# Patient Record
Sex: FEMALE | Race: WHITE | NOT HISPANIC OR LATINO | Employment: UNEMPLOYED | URBAN - METROPOLITAN AREA
[De-identification: names, ages, dates, MRNs, and addresses within clinical notes are randomized per-mention and may not be internally consistent; named-entity substitution may affect disease eponyms.]

---

## 2023-02-28 LAB — URINE CULTURE: ABNORMAL

## 2023-03-02 LAB
ABO GROUP (TYPE) IN BLOOD: NORMAL
ALANINE AMINOTRANSFERASE (SGPT) (U/L) IN SER/PLAS: 17 U/L (ref 7–45)
ANTIBODY SCREEN: NORMAL
ASPARTATE AMINOTRANSFERASE (SGOT) (U/L) IN SER/PLAS: 14 U/L (ref 9–39)
ERYTHROCYTE DISTRIBUTION WIDTH (RATIO) BY AUTOMATED COUNT: 13.8 % (ref 11.5–14.5)
ERYTHROCYTE MEAN CORPUSCULAR HEMOGLOBIN CONCENTRATION (G/DL) BY AUTOMATED: 33.4 G/DL (ref 32–36)
ERYTHROCYTE MEAN CORPUSCULAR VOLUME (FL) BY AUTOMATED COUNT: 93 FL (ref 80–100)
ERYTHROCYTES (10*6/UL) IN BLOOD BY AUTOMATED COUNT: 4.24 X10E12/L (ref 4–5.2)
HEMATOCRIT (%) IN BLOOD BY AUTOMATED COUNT: 39.5 % (ref 36–46)
HEMOGLOBIN (G/DL) IN BLOOD: 13.2 G/DL (ref 12–16)
HEPATITIS B VIRUS SURFACE AG PRESENCE IN SERUM: NONREACTIVE
HEPATITIS C VIRUS AB PRESENCE IN SERUM: NONREACTIVE
HIV 1/ 2 AG/AB SCREEN: NONREACTIVE
LEUKOCYTES (10*3/UL) IN BLOOD BY AUTOMATED COUNT: 8.3 X10E9/L (ref 4.4–11.3)
NRBC (PER 100 WBCS) BY AUTOMATED COUNT: 0 /100 WBC (ref 0–0)
PLATELETS (10*3/UL) IN BLOOD AUTOMATED COUNT: 199 X10E9/L (ref 150–450)
REFLEX ADDED, ANEMIA PANEL: NORMAL
RH FACTOR: NORMAL
RUBELLA VIRUS IGG AB: POSITIVE
SYPHILIS TOTAL AB: NONREACTIVE
URATE (MG/DL) IN SER/PLAS: 3.6 MG/DL (ref 2.3–6.7)

## 2023-03-14 LAB
CHLAMYDIA TRACH., AMPLIFIED: NEGATIVE
COLLECTION DURATION OF URINE: 24 HR
CREATININE (MG/24HR) IN 24 HOUR URINE: 1.72 G/24H (ref 0.67–1.59)
CREATININE (MG/DL) IN URINE: 108.5 MG/DL (ref 20–320)
N. GONORRHEA, AMPLIFIED: NEGATIVE
PROTEIN (MG/24HR) IN 24 HOUR URINE: 207 MG/24H (ref 0–149)
PROTEIN URINE: 13 MG/DL
VOLUME OF URINE: 1589 ML

## 2023-04-26 LAB
GLUCOSE, 1 HR SCREEN, PREG: 141 MG/DL
HEMATOCRIT (%) IN BLOOD BY AUTOMATED COUNT: 37.6 % (ref 36–46)
HEMOGLOBIN (G/DL) IN BLOOD: 12.1 G/DL (ref 12–16)
HEPATITIS B VIRUS SURFACE AG PRESENCE IN SERUM: NONREACTIVE

## 2023-05-05 LAB
GLUCOSE THREE HOUR: 87 MG/DL
GLUCOSE TWO HOUR: 91 MG/DL
GLUCOSE, FASTING: 73 MG/DL
GLUCOSE, ONE HOUR: 159 MG/DL
GTTCM: NORMAL

## 2023-08-06 LAB — GROUP B STREP SCREEN: NORMAL

## 2023-08-16 ENCOUNTER — HOSPITAL ENCOUNTER (OUTPATIENT)
Dept: DATA CONVERSION | Facility: HOSPITAL | Age: 28
End: 2023-08-16
Attending: OBSTETRICS & GYNECOLOGY
Payer: COMMERCIAL

## 2023-08-16 DIAGNOSIS — Z87.59 PERSONAL HISTORY OF OTHER COMPLICATIONS OF PREGNANCY, CHILDBIRTH AND THE PUERPERIUM: ICD-10-CM

## 2023-08-16 DIAGNOSIS — Z3A.38 38 WEEKS GESTATION OF PREGNANCY (HHS-HCC): ICD-10-CM

## 2023-08-16 DIAGNOSIS — O26.893 OTHER SPECIFIED PREGNANCY RELATED CONDITIONS, THIRD TRIMESTER (HHS-HCC): ICD-10-CM

## 2023-08-16 DIAGNOSIS — R03.0 ELEVATED BLOOD-PRESSURE READING, WITHOUT DIAGNOSIS OF HYPERTENSION: ICD-10-CM

## 2023-08-16 LAB
ABO GROUP (TYPE) IN BLOOD: NORMAL
ALANINE AMINOTRANSFERASE (SGPT) (U/L) IN SER/PLAS: 12 U/L (ref 7–45)
ALBUMIN (G/DL) IN SER/PLAS: 3.6 G/DL (ref 3.4–5)
ALKALINE PHOSPHATASE (U/L) IN SER/PLAS: 105 U/L (ref 33–110)
ANION GAP IN SER/PLAS: 14 MMOL/L (ref 10–20)
ANTIBODY SCREEN: NORMAL
ASPARTATE AMINOTRANSFERASE (SGOT) (U/L) IN SER/PLAS: 13 U/L (ref 9–39)
BILIRUBIN TOTAL (MG/DL) IN SER/PLAS: 0.9 MG/DL (ref 0–1.2)
CALCIUM (MG/DL) IN SER/PLAS: 8.6 MG/DL (ref 8.6–10.6)
CARBON DIOXIDE, TOTAL (MMOL/L) IN SER/PLAS: 22 MMOL/L (ref 21–32)
CHLORIDE (MMOL/L) IN SER/PLAS: 107 MMOL/L (ref 98–107)
CREATININE (MG/DL) IN SER/PLAS: 0.43 MG/DL (ref 0.5–1.05)
CREATININE (MG/DL) IN URINE: 40.2 MG/DL (ref 20–320)
ERYTHROCYTE DISTRIBUTION WIDTH (RATIO) BY AUTOMATED COUNT: 14.5 % (ref 11.5–14.5)
ERYTHROCYTE MEAN CORPUSCULAR HEMOGLOBIN CONCENTRATION (G/DL) BY AUTOMATED: 32.9 G/DL (ref 32–36)
ERYTHROCYTE MEAN CORPUSCULAR VOLUME (FL) BY AUTOMATED COUNT: 92 FL (ref 80–100)
ERYTHROCYTES (10*6/UL) IN BLOOD BY AUTOMATED COUNT: 3.7 X10E12/L (ref 4–5.2)
GFR FEMALE: >90 ML/MIN/1.73M2
GLUCOSE (MG/DL) IN SER/PLAS: 92 MG/DL (ref 74–99)
HEMATOCRIT (%) IN BLOOD BY AUTOMATED COUNT: 34 % (ref 36–46)
HEMOGLOBIN (G/DL) IN BLOOD: 11.2 G/DL (ref 12–16)
LACTATE DEHYDROGENASE (U/L) IN SER/PLAS BY LAC->PYR RXN: 162 U/L (ref 84–246)
LEUKOCYTES (10*3/UL) IN BLOOD BY AUTOMATED COUNT: 9.7 X10E9/L (ref 4.4–11.3)
NRBC (PER 100 WBCS) BY AUTOMATED COUNT: 0 /100 WBC (ref 0–0)
PLATELETS (10*3/UL) IN BLOOD AUTOMATED COUNT: 152 X10E9/L (ref 150–450)
POTASSIUM (MMOL/L) IN SER/PLAS: 3.9 MMOL/L (ref 3.5–5.3)
PROTEIN (MG/DL) IN URINE: 8 MG/DL (ref 5–24)
PROTEIN TOTAL: 6.2 G/DL (ref 6.4–8.2)
PROTEIN/CREATININE (MG/MG) IN URINE: 0.2 MG/MG CREAT (ref 0–0.17)
RH FACTOR: NORMAL
SODIUM (MMOL/L) IN SER/PLAS: 139 MMOL/L (ref 136–145)
URATE (MG/DL) IN SER/PLAS: 4.7 MG/DL (ref 2.3–6.7)
UREA NITROGEN (MG/DL) IN SER/PLAS: 8 MG/DL (ref 6–23)

## 2023-09-29 VITALS — HEIGHT: 65 IN | BODY MASS INDEX: 39.89 KG/M2 | WEIGHT: 239.42 LBS

## 2023-09-30 NOTE — PROGRESS NOTES
Current Stage:   Stage: Triage     OB Dating:   EDC/EGA:  ·  Final ALISE 27-Aug-2023   ·  EGA 38.3     Subjective Data:   Antepartum:  Vaginal Bleeding: No   Contractions/Abdominal Pain: Yes   Discharge/Loss of Fluid: No   Fetal Movement: Good   Fevers/Chills: No   Preeclampsia Symptoms: No   Antepartum:    26yo  at 38.3wks presenting for increased blood pressures at home. Pt states that she had a headache earlier today and subsequently took her blood pressure  several times throughout the course of the day. The headache resolved with Tylenol. Pt reports that her home pressures were 130s/80s-90s. Denies HA, vision changes, CP, SOB, leg swelling. Reports good fetal movement, some contractions. Denies VB/LOF.    ObHx:  G1, ,  41.5wks, 9#4oz, shoulder dytocia  G2, ,  40.1wks, PEC  G3, , SAB 10.5wks  G4, , , 40wks, 8#7oz  GynHx: Denies STIs/abnormal paps  PMH: Asthma  PSH: none  Meds: PNV  Allergies: NKDA  Family: non-contributory  Social: denies t/e/d use      Objective Information:    Objective Information:      T   P  R  BP   MAP  SpO2   Value  36.1  90  18  126/73   94  95%  Date/Time  1:02  1:17  1:02  1:17   1:17  1:16  Range  (36.1C - 36.1C )  (84 - 95 )  (18 - 18 )  (126 - 129 )/ (73 - 75 )  (94 - 96 )  (95% - 98% )      Pain reported at  1:02: 0 = None      Physical Exam:   Constitutional: Well appearing   Obstetric: SVE: 2.5/50/-3  Fetal Tracing: baseline 150s, moderate variability, +accels, -decels  [place]: contractions q12 minutes   Eyes: Pupils equal   Respiratory/Thorax: Breathing comfortably on RA   Cardiovascular: Well-perfused   Musculoskeletal: Full range of motion   Neurological: Appropriate affect   Skin: No rashes/lesions     Recent Lab Results:    Results:        I have reviewed these laboratory results:    Complete Blood Count  16-Aug-2023 01:17:00      Result Value    White Blood Cell Count  9.7    Nucleated Erythrocyte Count  0.0     Red Blood Cell Count  3.70   L   HGB  11.2   L   HCT  34.0   L   MCV  92    MCHC  32.9    PLT  152    RDW-CV  14.5      Comprehensive Metabolic Panel  16-Aug-2023 01:17:00      Result Value    Glucose, Serum  92    NA  139    K  3.9    CL  107    Bicarbonate, Serum  22    Anion Gap, Serum  14    BUN  8    CREAT  0.43   L   GFR Female  >90    Calcium, Serum  8.6    ALB  3.6    ALKP  105    T Pro  6.2   L   T Bili  0.9    Alanine Aminotransferase, Serum  12    Aspartate Transaminase, Serum  13      Total Protein, Urine Spot  16-Aug-2023 01:17:00      Result Value    Total Protein Urine Spot  8    T. Protein/Creat Ratio  0.20   H   Creatinine, Urine Spot  40.2      Lactate Dehydrogenase, Serum  16-Aug-2023 01:17:00      Result Value    LDH  162      Uric Acid, Serum  16-Aug-2023 01:17:00      Result Value    Uric Acid, Serum  4.7          Testing:   NST Interpretation - Baby A:  ·  Baseline     ·  Variability moderate (amplitude range 6 to 25 bpm)    ·  Interpretation Reactive (2 15x15 accels)    ·  Accelerations +   ·  Decelerations -     Assessment and Plan:   Assessment:    26yo  at 38.3wks presenting for increased blood pressures at home.  Pt otherwise has no hx of mild range pressures during this pregnancy and has been normotensive  in triage.    High normal BPs with one diastolic in 90s at home, h/o PEC, normotensive here in triage, asymptomatic, normal labs  - Pt does not currently meet criteria for gHTN  - HELLP labs normal  - Continue home BP monitoring.   F/u in office in 5 days as scheduled, sooner as needed.     Maia Arreola, MS4  Labor and Deliver Acting Intern    Discussed with Dr. Hammer and Dr. Kasper    Attestation:   Note Completion:  I am a:  Medical Student/Acting Intern   Medical Student Attestation I, or a resident under my supervision, was present with the medical student who participated in the documentation of this note.  I have personally seen and examined the patient and  performed the medical decision-making components. I have reviewed the medical student documentation and/or resident documentation and verified the findings in the note as written with additions or exceptions  as stated in the body of this note.    I personally evaluated the patient on 16-Aug-2023   Comments/ Additional Findings     @ 38 weeks 3d, presented with higher BPs at home with one diastolic in the 90s.  h/o PEC  Normotensive here.  Labs normal  Cat 1 tracing  Continue home BP monitoring.  Return precautions discussed  Bryan Kasper MD          Electronic Signatures:  Roberto Carlos Arreolahel (MED STUD)  (Signed 16-Aug-2023 02:07)   Authored: Current Stage, OB Dating, Subjective Data,  Objective Data, Assessment and Plan, Note Completion  Bryan Kasper)  (Signed 16-Aug-2023 03:09)   Authored: Objective Data,  Testing, Assessment  and Plan, Note Completion      Last Updated: 16-Aug-2023 03:09 by Bryan Kasper)

## 2024-01-11 ENCOUNTER — APPOINTMENT (OUTPATIENT)
Dept: OBSTETRICS AND GYNECOLOGY | Facility: CLINIC | Age: 29
End: 2024-01-11
Payer: COMMERCIAL

## 2024-01-11 ENCOUNTER — OFFICE VISIT (OUTPATIENT)
Dept: OBSTETRICS AND GYNECOLOGY | Facility: CLINIC | Age: 29
End: 2024-01-11
Payer: COMMERCIAL

## 2024-01-11 VITALS
WEIGHT: 208 LBS | HEIGHT: 65 IN | DIASTOLIC BLOOD PRESSURE: 62 MMHG | SYSTOLIC BLOOD PRESSURE: 114 MMHG | BODY MASS INDEX: 34.66 KG/M2

## 2024-01-11 DIAGNOSIS — Z30.41 SURVEILLANCE FOR BIRTH CONTROL, ORAL CONTRACEPTIVES: ICD-10-CM

## 2024-01-11 DIAGNOSIS — Z01.419 WELL WOMAN EXAM WITH ROUTINE GYNECOLOGICAL EXAM: Primary | ICD-10-CM

## 2024-01-11 PROCEDURE — 88175 CYTOPATH C/V AUTO FLUID REDO: CPT

## 2024-01-11 PROCEDURE — 1036F TOBACCO NON-USER: CPT | Performed by: OBSTETRICS & GYNECOLOGY

## 2024-01-11 PROCEDURE — 99395 PREV VISIT EST AGE 18-39: CPT | Performed by: OBSTETRICS & GYNECOLOGY

## 2024-01-11 RX ORDER — NORETHINDRONE 0.35 MG/1
1 TABLET ORAL
COMMUNITY
Start: 2023-09-05 | End: 2024-01-11 | Stop reason: SDUPTHER

## 2024-01-11 RX ORDER — NORETHINDRONE 0.35 MG/1
1 TABLET ORAL
Qty: 90 TABLET | Refills: 3 | Status: SHIPPED | OUTPATIENT
Start: 2024-01-11

## 2024-01-11 NOTE — PROGRESS NOTES
"Miriam Landau is a 28 y.o. female who is here for a routine exam.       Sexually active: [place]    Regular self breast exam: yes  No concerns on her exams    Menstrual History:  OB History          5    Para   5    Term                AB        Living   5         SAB        IAB        Ectopic        Multiple        Live Births                    No LMP recorded (lmp unknown).         Review of Systems  All Normal Review of Systems  Constitutional: no fever, no chills, no recent weight gain, no recent weight loss and no fatigue.    Gastrointestinal: no abdominal pain, no constipation, no nausea, no diarrhea and no vomiting.    Genitourinary: no dysuria, no urinary incontinence, no vaginal dryness, no vaginal itching, no dyspareunia, no pelvic pain, no dysmenorrhea, no sexual problems, no change in urinary frequency, no vaginal discharge, no unexplained vaginal bleeding and no lesion/sore.    Breasts: No masses.  No nipple discharge.  No redness    Objective   /62   Ht 1.651 m (5' 5\")   Wt 94.3 kg (208 lb)   LMP  (LMP Unknown)   Breastfeeding Yes   BMI 34.61 kg/m²     OBGyn Exam   Physical Exam  Constitutional: Alert and in no acute distress. Well developed, well nourished.   Head and Face: Head and face: Normal.    Eyes: Normal external exam - nonicteric sclera, extraocular movements intact (EOMI) and no ptosis.   Ears, Nose, Mouth, and Throat: External inspection of ears and nose: Normal.    Neck: No neck asymmetry. Supple. Thyroid not enlarged and there were no palpable thyroid nodules.   Chest: Breasts: Normal appearance, no nipple discharge and no skin changes. Palpation of breasts and axillae: No palpable mass and no axillary lymphadenopathy.   Abdomen: Soft nontender; no abdominal mass palpated. No organomegaly. No hernias.     Genitourinary:   External genitalia: Normal. No inguinal lymphadenopathy. Bartholin's Urethral and Skenes Glands: Normal. Urethra: Normal.    Bladder: Normal " on palpation.   Vagina: Normal. No discharge  Cervix: Normal.    Uterus: Normal.    Right Adnexa/parametria: Normal.  Left Adnexa/parametria: Normal.    Inspection of Perianal Area: Normal.     Musculoskeletal: No joint swelling seen, normal movements of all extremities.   Skin: Normal skin color and pigmentation, normal skin turgor, and no rash.   Neurologic: Non-focal. Grossly intact.   Psychiatric: Alert and oriented x 3. Affect normal to patient baseline. Mood: Appropriate.      Assessment/Plan   1) Annual exam:  Pap with reflex HPV testing completed today    Thank you again for your visit to our office today  Please follow-up as needed or in 1 year with your annual exam

## 2024-01-24 LAB
CYTOLOGY CMNT CVX/VAG CYTO-IMP: NORMAL
LAB AP HPV GENOTYPE QUESTION: YES
LAB AP HPV HR: NORMAL
LABORATORY COMMENT REPORT: NORMAL
PATH REPORT.TOTAL CANCER: NORMAL

## 2024-07-10 ENCOUNTER — TELEPHONE (OUTPATIENT)
Dept: OBSTETRICS AND GYNECOLOGY | Facility: CLINIC | Age: 29
End: 2024-07-10
Payer: COMMERCIAL

## 2024-07-10 NOTE — TELEPHONE ENCOUNTER
Miriam Landau called in requesting Rx for birth control .    Last Office Visit: 1/11/2024  Next Office Visit: 1/16/2025  Med: norethindrone (Micronor) 0.35 mg tablet   [facility]: [facility]

## 2024-11-06 ENCOUNTER — TELEPHONE (OUTPATIENT)
Dept: OBSTETRICS AND GYNECOLOGY | Facility: CLINIC | Age: 29
End: 2024-11-06
Payer: COMMERCIAL

## 2024-11-06 NOTE — TELEPHONE ENCOUNTER
Left voicemail to call office back so that we can do her NEW ob intake questions before 11/07/2024 appointment.    Fartun Helton MA

## 2024-11-07 ENCOUNTER — LAB REQUISITION (OUTPATIENT)
Dept: LAB | Facility: HOSPITAL | Age: 29
End: 2024-11-07
Payer: COMMERCIAL

## 2024-11-07 ENCOUNTER — APPOINTMENT (OUTPATIENT)
Dept: OBSTETRICS AND GYNECOLOGY | Facility: CLINIC | Age: 29
End: 2024-11-07
Payer: COMMERCIAL

## 2024-11-07 VITALS — BODY MASS INDEX: 32.62 KG/M2 | WEIGHT: 196 LBS | SYSTOLIC BLOOD PRESSURE: 100 MMHG | DIASTOLIC BLOOD PRESSURE: 62 MMHG

## 2024-11-07 DIAGNOSIS — Z64.1 PROBLEMS RELATED TO MULTIPARITY: ICD-10-CM

## 2024-11-07 DIAGNOSIS — Z64.1 GRAND MULTIPARITY: ICD-10-CM

## 2024-11-07 DIAGNOSIS — Z3A.09 9 WEEKS GESTATION OF PREGNANCY (HHS-HCC): ICD-10-CM

## 2024-11-07 DIAGNOSIS — N92.6 IRREGULAR MENSES: ICD-10-CM

## 2024-11-07 DIAGNOSIS — Z3A.09 9 WEEKS GESTATION OF PREGNANCY (HHS-HCC): Primary | ICD-10-CM

## 2024-11-07 LAB
POC BILIRUBIN, URINE: NEGATIVE
POC BLOOD, URINE: NEGATIVE
POC GLUCOSE, URINE: NEGATIVE MG/DL
POC KETONES, URINE: NEGATIVE MG/DL
POC LEUKOCYTES, URINE: ABNORMAL
POC NITRITE,URINE: NEGATIVE
POC PH, URINE: 6 PH
POC PROTEIN, URINE: NEGATIVE MG/DL
POC SPECIFIC GRAVITY, URINE: 1.02
POC UROBILINOGEN, URINE: 0.2 EU/DL
PREGNANCY TEST URINE, POC: POSITIVE

## 2024-11-07 PROCEDURE — 87086 URINE CULTURE/COLONY COUNT: CPT

## 2024-11-07 PROCEDURE — 86803 HEPATITIS C AB TEST: CPT

## 2024-11-07 PROCEDURE — 86317 IMMUNOASSAY INFECTIOUS AGENT: CPT

## 2024-11-07 PROCEDURE — 86850 RBC ANTIBODY SCREEN: CPT

## 2024-11-07 PROCEDURE — 87389 HIV-1 AG W/HIV-1&-2 AB AG IA: CPT

## 2024-11-07 PROCEDURE — 86901 BLOOD TYPING SEROLOGIC RH(D): CPT

## 2024-11-07 PROCEDURE — 87491 CHLMYD TRACH DNA AMP PROBE: CPT

## 2024-11-07 PROCEDURE — 86780 TREPONEMA PALLIDUM: CPT

## 2024-11-07 PROCEDURE — 85027 COMPLETE CBC AUTOMATED: CPT

## 2024-11-07 PROCEDURE — 87591 N.GONORRHOEAE DNA AMP PROB: CPT

## 2024-11-07 PROCEDURE — 86900 BLOOD TYPING SEROLOGIC ABO: CPT

## 2024-11-07 PROCEDURE — 87340 HEPATITIS B SURFACE AG IA: CPT

## 2024-11-07 RX ORDER — DEXTROMETHORPHAN HYDROBROMIDE, GUAIFENESIN 30; 600 MG/1; MG/1
TABLET, EXTENDED RELEASE ORAL
COMMUNITY
Start: 2024-10-28

## 2024-11-07 RX ORDER — FLUTICASONE PROPIONATE 50 MCG
SPRAY, SUSPENSION (ML) NASAL
COMMUNITY
Start: 2019-03-17

## 2024-11-07 ASSESSMENT — EDINBURGH POSTNATAL DEPRESSION SCALE (EPDS)
THE THOUGHT OF HARMING MYSELF HAS OCCURRED TO ME: NEVER
I HAVE BEEN ANXIOUS OR WORRIED FOR NO GOOD REASON: NO, NOT AT ALL
I HAVE BEEN ABLE TO LAUGH AND SEE THE FUNNY SIDE OF THINGS: AS MUCH AS I ALWAYS COULD
TOTAL SCORE: 0
I HAVE LOOKED FORWARD WITH ENJOYMENT TO THINGS: AS MUCH AS I EVER DID
I HAVE BEEN SO UNHAPPY THAT I HAVE HAD DIFFICULTY SLEEPING: NOT AT ALL
I HAVE FELT SAD OR MISERABLE: NO, NOT AT ALL
THINGS HAVE BEEN GETTING ON TOP OF ME: NO, I HAVE BEEN COPING AS WELL AS EVER
I HAVE BLAMED MYSELF UNNECESSARILY WHEN THINGS WENT WRONG: NO, NEVER
I HAVE BEEN SO UNHAPPY THAT I HAVE BEEN CRYING: NO, NEVER
I HAVE FELT SCARED OR PANICKY FOR NO GOOD REASON: NO, NOT AT ALL

## 2024-11-07 NOTE — PROGRESS NOTES
Subjective   Patient ID [id number]   Miriam Landau is a 28 y.o.  at 9w4d with a working estimated date of delivery of 2025, by Last Menstrual Period who presents for an initial prenatal visit.     Her pregnancy is complicated by:  Grand multipara    OB History    Para Term  AB Living   5 4       5   SAB IAB Ectopic Multiple Live Births           4      # Outcome Date GA Lbr Bobby/2nd Weight Sex Type Anes PTL Lv   5 Current            4 Para 23    M Vag-Spont   VINNY   3 Para 21    M Vag-Spont   VINNY   2 Para 20    F Vag-Spont   VINNY      Complications: Preeclampsia (HHS-HCC)   1 Para 10//    M Vag-Spont   VINNY      Complications: Shoulder Dystocia     [place]  Depression Scale Total: 0    Objective   : see OB vitals          OBGyn Exam : see OB chart    Prenatal Labs  Ordered today    Assessment/Plan   1) IUP at 9w4d  Prenatal Labs ordered  Genetics declined  Delivery at [facility] and call group discussed with patient.  Daily prenatal vitamins prescribed    2) Grand multipara    Follow up in 4 weeks for return OB visit.

## 2024-11-08 LAB
ABO GROUP (TYPE) IN BLOOD: NORMAL
ANTIBODY SCREEN: NORMAL
C TRACH RRNA SPEC QL NAA+PROBE: NEGATIVE
ERYTHROCYTE [DISTWIDTH] IN BLOOD BY AUTOMATED COUNT: 13.2 % (ref 11.5–14.5)
HBV SURFACE AG SERPL QL IA: NONREACTIVE
HCT VFR BLD AUTO: 40.3 % (ref 36–46)
HCV AB SER QL: NONREACTIVE
HGB BLD-MCNC: 13.2 G/DL (ref 12–16)
HIV 1+2 AB+HIV1 P24 AG SERPL QL IA: NONREACTIVE
MCH RBC QN AUTO: 30.3 PG (ref 26–34)
MCHC RBC AUTO-ENTMCNC: 32.8 G/DL (ref 32–36)
MCV RBC AUTO: 92 FL (ref 80–100)
N GONORRHOEA DNA SPEC QL PROBE+SIG AMP: NEGATIVE
NRBC BLD-RTO: 0 /100 WBCS (ref 0–0)
PLATELET # BLD AUTO: 220 X10*3/UL (ref 150–450)
RBC # BLD AUTO: 4.36 X10*6/UL (ref 4–5.2)
REFLEX ADDED, ANEMIA PANEL: NORMAL
RH FACTOR (ANTIGEN D): NORMAL
RUBV IGG SERPL IA-ACNC: 2 IA
RUBV IGG SERPL QL IA: POSITIVE
TREPONEMA PALLIDUM IGG+IGM AB [PRESENCE] IN SERUM OR PLASMA BY IMMUNOASSAY: NONREACTIVE
WBC # BLD AUTO: 9 X10*3/UL (ref 4.4–11.3)

## 2024-11-09 LAB — BACTERIA UR CULT: NORMAL

## 2024-12-02 ENCOUNTER — APPOINTMENT (OUTPATIENT)
Dept: OBSTETRICS AND GYNECOLOGY | Facility: CLINIC | Age: 29
End: 2024-12-02
Payer: COMMERCIAL

## 2024-12-02 VITALS — BODY MASS INDEX: 32.62 KG/M2 | DIASTOLIC BLOOD PRESSURE: 60 MMHG | WEIGHT: 196 LBS | SYSTOLIC BLOOD PRESSURE: 110 MMHG

## 2024-12-02 DIAGNOSIS — Z36.9 PRENATAL SCREENING ENCOUNTER: Primary | ICD-10-CM

## 2024-12-02 DIAGNOSIS — Z64.1 GRAND MULTIPARITY: ICD-10-CM

## 2024-12-02 DIAGNOSIS — Z3A.13 13 WEEKS GESTATION OF PREGNANCY (HHS-HCC): ICD-10-CM

## 2024-12-02 LAB
POC BILIRUBIN, URINE: NEGATIVE
POC BLOOD, URINE: ABNORMAL
POC GLUCOSE, URINE: NEGATIVE MG/DL
POC KETONES, URINE: NEGATIVE MG/DL
POC LEUKOCYTES, URINE: NEGATIVE
POC NITRITE,URINE: NEGATIVE
POC PH, URINE: 5.5 PH
POC PROTEIN, URINE: NEGATIVE MG/DL
POC SPECIFIC GRAVITY, URINE: 1.02
POC UROBILINOGEN, URINE: 0.2 EU/DL

## 2024-12-02 PROCEDURE — 99213 OFFICE O/P EST LOW 20 MIN: CPT | Performed by: OBSTETRICS & GYNECOLOGY

## 2024-12-02 NOTE — PROGRESS NOTES
Subjective   Patient ID [id number]   Miriam Landau is a 29 y.o. at 13w1d with a working estimated date of delivery of Estimated Date of Delivery: 6/8/25 She denies vaginal bleeding, leakage of fluid, decreased fetal movements, and contractions.      Assessment/Plan    1) IUP 13w1d   Continue prenatal vitamin.  Labs: Rh+  All testing within normal limits  Declined genetics  Will schedule her 19-week anatomy scan    2) Grand multipara     Follow-up in 4 wk

## 2024-12-02 NOTE — LETTER
12/2/2024    To Whom It May Concern:    This is to certify that my patient,Miriam Landau is under my care for her pregnancy.    The following guidelines may be used for any dental work:  You may use a local anesthetic with or without Epinephrine.  You may prescribe any Penicillin or Cephalosporin if an antibiotic is necessary. (Dependent on allergy history)  You may take x-rays with a lead apron if necessary.  You may prescribe Tylenol and/or narcotics for pain.  You may extract teeth if necessary.    If you need further information or if you have any concerns, please contact our office.    Sincerely,        Shayan Desouza MD   Candice Patel

## 2024-12-29 PROBLEM — Z3A.17 17 WEEKS GESTATION OF PREGNANCY (HHS-HCC): Status: ACTIVE | Noted: 2024-12-29

## 2024-12-30 ENCOUNTER — APPOINTMENT (OUTPATIENT)
Dept: OBSTETRICS AND GYNECOLOGY | Facility: CLINIC | Age: 29
End: 2024-12-30
Payer: COMMERCIAL

## 2024-12-30 VITALS — SYSTOLIC BLOOD PRESSURE: 120 MMHG | WEIGHT: 198.8 LBS | BODY MASS INDEX: 33.08 KG/M2 | DIASTOLIC BLOOD PRESSURE: 60 MMHG

## 2024-12-30 DIAGNOSIS — Z3A.17 17 WEEKS GESTATION OF PREGNANCY (HHS-HCC): ICD-10-CM

## 2024-12-30 DIAGNOSIS — Z36.9 PRENATAL SCREENING ENCOUNTER: Primary | ICD-10-CM

## 2024-12-30 LAB
POC BILIRUBIN, URINE: NEGATIVE
POC BLOOD, URINE: ABNORMAL
POC GLUCOSE, URINE: NEGATIVE MG/DL
POC KETONES, URINE: NEGATIVE MG/DL
POC LEUKOCYTES, URINE: NEGATIVE
POC NITRITE,URINE: NEGATIVE
POC PH, URINE: 5.5 PH
POC PROTEIN, URINE: NEGATIVE MG/DL
POC SPECIFIC GRAVITY, URINE: 1.01
POC UROBILINOGEN, URINE: 0.2 EU/DL

## 2024-12-30 PROCEDURE — 99213 OFFICE O/P EST LOW 20 MIN: CPT | Performed by: OBSTETRICS & GYNECOLOGY

## 2024-12-30 NOTE — PROGRESS NOTES
Subjective   Patient ID [id number]   Miriam Landau is a 29 y.o. at 17w1d with a working estimated date of delivery of Estimated Date of Delivery: 6/8/25 She denies vaginal bleeding, leakage of fluid, decreased fetal movements, and contractions.      Assessment/Plan    1) IUP 17w1d   Continue prenatal vitamin.  Labs: Rh+    2) Grand multipara     Follow-up in 4 wk

## 2025-01-13 ENCOUNTER — HOSPITAL ENCOUNTER (OUTPATIENT)
Dept: RADIOLOGY | Facility: CLINIC | Age: 30
Discharge: HOME | End: 2025-01-13
Payer: COMMERCIAL

## 2025-01-13 DIAGNOSIS — Z3A.09 9 WEEKS GESTATION OF PREGNANCY (HHS-HCC): ICD-10-CM

## 2025-01-13 DIAGNOSIS — Z64.1 GRAND MULTIPARITY: ICD-10-CM

## 2025-01-13 PROCEDURE — 76811 OB US DETAILED SNGL FETUS: CPT | Performed by: STUDENT IN AN ORGANIZED HEALTH CARE EDUCATION/TRAINING PROGRAM

## 2025-01-13 PROCEDURE — 76811 OB US DETAILED SNGL FETUS: CPT

## 2025-01-16 ENCOUNTER — APPOINTMENT (OUTPATIENT)
Dept: OBSTETRICS AND GYNECOLOGY | Facility: CLINIC | Age: 30
End: 2025-01-16
Payer: COMMERCIAL

## 2025-01-27 ENCOUNTER — APPOINTMENT (OUTPATIENT)
Dept: OBSTETRICS AND GYNECOLOGY | Facility: CLINIC | Age: 30
End: 2025-01-27
Payer: COMMERCIAL

## 2025-01-27 VITALS — WEIGHT: 198 LBS | SYSTOLIC BLOOD PRESSURE: 110 MMHG | BODY MASS INDEX: 32.95 KG/M2 | DIASTOLIC BLOOD PRESSURE: 60 MMHG

## 2025-01-27 DIAGNOSIS — Z36.9 PRENATAL SCREENING ENCOUNTER: Primary | ICD-10-CM

## 2025-01-27 DIAGNOSIS — Z3A.21 21 WEEKS GESTATION OF PREGNANCY (HHS-HCC): ICD-10-CM

## 2025-01-27 LAB
POC BILIRUBIN, URINE: NEGATIVE
POC BLOOD, URINE: NEGATIVE
POC GLUCOSE, URINE: NEGATIVE MG/DL
POC KETONES, URINE: NEGATIVE MG/DL
POC LEUKOCYTES, URINE: NEGATIVE
POC NITRITE,URINE: NEGATIVE
POC PH, URINE: 5.5 PH
POC PROTEIN, URINE: NEGATIVE MG/DL
POC SPECIFIC GRAVITY, URINE: 1.02
POC UROBILINOGEN, URINE: 0.2 EU/DL

## 2025-01-27 PROCEDURE — 99213 OFFICE O/P EST LOW 20 MIN: CPT | Performed by: OBSTETRICS & GYNECOLOGY

## 2025-01-27 NOTE — PROGRESS NOTES
Subjective   Patient ID [id number]   Miriam Landau is a 29 y.o. at 21w1d with a working estimated date of delivery of Estimated Date of Delivery: 6/8/25 She denies vaginal bleeding, leakage of fluid, decreased fetal movements, and contractions.      Assessment/Plan    1) IUP 21w1d   Continue prenatal vitamin.  Labs: rh+  Glucola to pt    2) grand multipara        Follow-up in 4 wk

## 2025-02-21 PROBLEM — Z3A.25 25 WEEKS GESTATION OF PREGNANCY (HHS-HCC): Status: ACTIVE | Noted: 2024-12-29

## 2025-02-24 ENCOUNTER — APPOINTMENT (OUTPATIENT)
Dept: OBSTETRICS AND GYNECOLOGY | Facility: CLINIC | Age: 30
End: 2025-02-24
Payer: COMMERCIAL

## 2025-02-24 VITALS — WEIGHT: 207 LBS | DIASTOLIC BLOOD PRESSURE: 60 MMHG | SYSTOLIC BLOOD PRESSURE: 120 MMHG | BODY MASS INDEX: 34.45 KG/M2

## 2025-02-24 DIAGNOSIS — Z13.1 DIABETES MELLITUS SCREENING: ICD-10-CM

## 2025-02-24 DIAGNOSIS — Z36.9 PRENATAL SCREENING ENCOUNTER: Primary | ICD-10-CM

## 2025-02-24 DIAGNOSIS — Z3A.25 25 WEEKS GESTATION OF PREGNANCY (HHS-HCC): ICD-10-CM

## 2025-02-24 LAB
POC BILIRUBIN, URINE: NEGATIVE
POC BLOOD, URINE: NEGATIVE
POC GLUCOSE, URINE: NEGATIVE MG/DL
POC KETONES, URINE: NEGATIVE MG/DL
POC LEUKOCYTES, URINE: NEGATIVE
POC NITRITE,URINE: NEGATIVE
POC PH, URINE: 6 PH
POC PROTEIN, URINE: NEGATIVE MG/DL
POC SPECIFIC GRAVITY, URINE: 1.02
POC UROBILINOGEN, URINE: 0.2 EU/DL

## 2025-02-24 PROCEDURE — 99213 OFFICE O/P EST LOW 20 MIN: CPT | Performed by: OBSTETRICS & GYNECOLOGY

## 2025-02-24 PROCEDURE — 1036F TOBACCO NON-USER: CPT | Performed by: OBSTETRICS & GYNECOLOGY

## 2025-02-24 NOTE — PROGRESS NOTES
Subjective   Patient ID [id number]   Miriam Landau is a 29 y.o. at 25w1d with a working estimated date of delivery of Estimated Date of Delivery: 6/8/25 She denies vaginal bleeding, leakage of fluid, decreased fetal movements, and contractions.      Assessment/Plan    1) IUP 25w1d   Continue prenatal vitamin.  Labs: Rh+  Glucola today    2) grand multipara     Follow-up in 4 wk

## 2025-02-25 LAB
GLUCOSE 1H P 50 G GLC PO SERPL-MCNC: 125 MG/DL
HBV SURFACE AG SERPL QL IA: NORMAL
HCT VFR BLD AUTO: 37.4 % (ref 35–45)
HGB BLD-MCNC: 12.4 G/DL (ref 11.7–15.5)

## 2025-03-10 ENCOUNTER — APPOINTMENT (OUTPATIENT)
Dept: OBSTETRICS AND GYNECOLOGY | Facility: CLINIC | Age: 30
End: 2025-03-10
Payer: COMMERCIAL

## 2025-03-24 ENCOUNTER — APPOINTMENT (OUTPATIENT)
Dept: OBSTETRICS AND GYNECOLOGY | Facility: CLINIC | Age: 30
End: 2025-03-24
Payer: COMMERCIAL

## 2025-03-24 VITALS — DIASTOLIC BLOOD PRESSURE: 64 MMHG | SYSTOLIC BLOOD PRESSURE: 132 MMHG | BODY MASS INDEX: 35.45 KG/M2 | WEIGHT: 213 LBS

## 2025-03-24 DIAGNOSIS — Z3A.29 29 WEEKS GESTATION OF PREGNANCY (HHS-HCC): ICD-10-CM

## 2025-03-24 DIAGNOSIS — Z36.9 PRENATAL SCREENING ENCOUNTER: Primary | ICD-10-CM

## 2025-03-24 LAB
POC BILIRUBIN, URINE: NEGATIVE
POC BLOOD, URINE: ABNORMAL
POC GLUCOSE, URINE: NEGATIVE MG/DL
POC KETONES, URINE: NEGATIVE MG/DL
POC LEUKOCYTES, URINE: NEGATIVE
POC NITRITE,URINE: NEGATIVE
POC PH, URINE: 5.5 PH
POC PROTEIN, URINE: NEGATIVE MG/DL
POC SPECIFIC GRAVITY, URINE: >=1.03
POC UROBILINOGEN, URINE: 0.2 EU/DL

## 2025-03-24 PROCEDURE — 99213 OFFICE O/P EST LOW 20 MIN: CPT | Performed by: OBSTETRICS & GYNECOLOGY

## 2025-03-24 NOTE — PROGRESS NOTES
Subjective   Patient ID [id number]   Miriam Landau is a 29 y.o. at 29w1d with a working estimated date of delivery of Estimated Date of Delivery: 6/8/25 She denies vaginal bleeding, leakage of fluid, decreased fetal movements, and contractions.      Assessment/Plan    1) IUP 29w1d   Continue prenatal vitamin.  Labs: Rh+.  Glucola within normal limits    2) grand multipara     Follow-up in 2 wk

## 2025-04-09 PROBLEM — Z3A.31 31 WEEKS GESTATION OF PREGNANCY (HHS-HCC): Status: ACTIVE | Noted: 2024-12-29

## 2025-04-10 ENCOUNTER — APPOINTMENT (OUTPATIENT)
Dept: OBSTETRICS AND GYNECOLOGY | Facility: CLINIC | Age: 30
End: 2025-04-10
Payer: COMMERCIAL

## 2025-04-10 VITALS — BODY MASS INDEX: 36.28 KG/M2 | DIASTOLIC BLOOD PRESSURE: 62 MMHG | WEIGHT: 218 LBS | SYSTOLIC BLOOD PRESSURE: 120 MMHG

## 2025-04-10 DIAGNOSIS — Z3A.31 31 WEEKS GESTATION OF PREGNANCY (HHS-HCC): ICD-10-CM

## 2025-04-10 DIAGNOSIS — Z36.9 PRENATAL SCREENING ENCOUNTER: Primary | ICD-10-CM

## 2025-04-10 LAB
POC BILIRUBIN, URINE: NEGATIVE
POC BLOOD, URINE: ABNORMAL
POC GLUCOSE, URINE: NEGATIVE MG/DL
POC KETONES, URINE: NEGATIVE MG/DL
POC LEUKOCYTES, URINE: ABNORMAL
POC NITRITE,URINE: NEGATIVE
POC PH, URINE: 5.5 PH
POC PROTEIN, URINE: ABNORMAL MG/DL
POC SPECIFIC GRAVITY, URINE: >=1.03
POC UROBILINOGEN, URINE: 0.2 EU/DL

## 2025-04-10 PROCEDURE — 1036F TOBACCO NON-USER: CPT | Performed by: OBSTETRICS & GYNECOLOGY

## 2025-04-10 PROCEDURE — 99213 OFFICE O/P EST LOW 20 MIN: CPT | Performed by: OBSTETRICS & GYNECOLOGY

## 2025-04-10 NOTE — PROGRESS NOTES
Subjective   Patient ID [id number]   Miriam Landau is a 29 y.o. at 31w4d with a working estimated date of delivery of Estimated Date of Delivery: 6/8/25 She denies vaginal bleeding, leakage of fluid, decreased fetal movements, and contractions.      Assessment/Plan    1) IUP 31w4d   Continue prenatal vitamin.  Labs: n/a    2) grand multipara      Follow-up in 2 wk

## 2025-04-18 PROBLEM — Z3A.33 33 WEEKS GESTATION OF PREGNANCY (HHS-HCC): Status: ACTIVE | Noted: 2024-12-29

## 2025-04-21 ENCOUNTER — APPOINTMENT (OUTPATIENT)
Dept: OBSTETRICS AND GYNECOLOGY | Facility: CLINIC | Age: 30
End: 2025-04-21
Payer: COMMERCIAL

## 2025-04-21 VITALS — BODY MASS INDEX: 36.28 KG/M2 | WEIGHT: 218 LBS | DIASTOLIC BLOOD PRESSURE: 60 MMHG | SYSTOLIC BLOOD PRESSURE: 122 MMHG

## 2025-04-21 DIAGNOSIS — Z36.9 PRENATAL SCREENING ENCOUNTER: Primary | ICD-10-CM

## 2025-04-21 DIAGNOSIS — Z3A.33 33 WEEKS GESTATION OF PREGNANCY (HHS-HCC): ICD-10-CM

## 2025-04-21 LAB
POC BILIRUBIN, URINE: NEGATIVE
POC BLOOD, URINE: NEGATIVE
POC GLUCOSE, URINE: NEGATIVE MG/DL
POC KETONES, URINE: NEGATIVE MG/DL
POC LEUKOCYTES, URINE: ABNORMAL
POC NITRITE,URINE: NEGATIVE
POC PH, URINE: 7.5 PH
POC PROTEIN, URINE: NEGATIVE MG/DL
POC SPECIFIC GRAVITY, URINE: 1.02
POC UROBILINOGEN, URINE: 0.2 EU/DL

## 2025-04-21 PROCEDURE — 99213 OFFICE O/P EST LOW 20 MIN: CPT | Performed by: OBSTETRICS & GYNECOLOGY

## 2025-04-21 PROCEDURE — 1036F TOBACCO NON-USER: CPT | Performed by: OBSTETRICS & GYNECOLOGY

## 2025-04-21 NOTE — PROGRESS NOTES
Subjective   Patient ID [id number]   Miriam Landau is a 29 y.o. at 33w1d with a working estimated date of delivery of Estimated Date of Delivery: 6/8/25 She denies vaginal bleeding, leakage of fluid, decreased fetal movements, and contractions.      Assessment/Plan    1) IUP 33w1d   Continue prenatal vitamin.  Labs: n/a    2) Grand multipara     Follow-up in 2 wk

## 2025-05-02 PROBLEM — Z3A.35 35 WEEKS GESTATION OF PREGNANCY (HHS-HCC): Status: ACTIVE | Noted: 2024-12-29

## 2025-05-05 ENCOUNTER — APPOINTMENT (OUTPATIENT)
Dept: OBSTETRICS AND GYNECOLOGY | Facility: CLINIC | Age: 30
End: 2025-05-05
Payer: COMMERCIAL

## 2025-05-05 VITALS — SYSTOLIC BLOOD PRESSURE: 124 MMHG | WEIGHT: 222 LBS | DIASTOLIC BLOOD PRESSURE: 64 MMHG | BODY MASS INDEX: 36.94 KG/M2

## 2025-05-05 DIAGNOSIS — Z36.9 PRENATAL SCREENING ENCOUNTER: Primary | ICD-10-CM

## 2025-05-05 DIAGNOSIS — Z3A.35 35 WEEKS GESTATION OF PREGNANCY (HHS-HCC): ICD-10-CM

## 2025-05-05 LAB
POC BILIRUBIN, URINE: NEGATIVE
POC BLOOD, URINE: NEGATIVE
POC GLUCOSE, URINE: NEGATIVE MG/DL
POC KETONES, URINE: NEGATIVE MG/DL
POC LEUKOCYTES, URINE: ABNORMAL
POC NITRITE,URINE: NEGATIVE
POC PH, URINE: 5.5 PH
POC PROTEIN, URINE: NEGATIVE MG/DL
POC SPECIFIC GRAVITY, URINE: 1.01
POC UROBILINOGEN, URINE: 0.2 EU/DL

## 2025-05-05 PROCEDURE — 99213 OFFICE O/P EST LOW 20 MIN: CPT | Performed by: OBSTETRICS & GYNECOLOGY

## 2025-05-05 PROCEDURE — 1036F TOBACCO NON-USER: CPT | Performed by: OBSTETRICS & GYNECOLOGY

## 2025-05-05 NOTE — PROGRESS NOTES
Subjective   Patient ID [id number]   Miriam Landau is a 29 y.o. at 35w1d with a working estimated date of delivery of Estimated Date of Delivery: 6/8/25 She denies vaginal bleeding, leakage of fluid, decreased fetal movements, and contractions.      Assessment/Plan    1) IUP 35w1d   Continue prenatal vitamin.  Labs: n/a    2) Grand multipara    Follow-up in 1 wk with GBS  
06-Jan-2022 08:12

## 2025-05-14 PROBLEM — Z3A.36 36 WEEKS GESTATION OF PREGNANCY (HHS-HCC): Status: ACTIVE | Noted: 2024-12-29

## 2025-05-15 ENCOUNTER — APPOINTMENT (OUTPATIENT)
Dept: OBSTETRICS AND GYNECOLOGY | Facility: CLINIC | Age: 30
End: 2025-05-15
Payer: COMMERCIAL

## 2025-05-15 VITALS — SYSTOLIC BLOOD PRESSURE: 120 MMHG | DIASTOLIC BLOOD PRESSURE: 64 MMHG | BODY MASS INDEX: 36.94 KG/M2 | WEIGHT: 222 LBS

## 2025-05-15 DIAGNOSIS — Z3A.36 36 WEEKS GESTATION OF PREGNANCY (HHS-HCC): ICD-10-CM

## 2025-05-15 DIAGNOSIS — Z36.9 PRENATAL SCREENING ENCOUNTER: Primary | ICD-10-CM

## 2025-05-15 LAB
POC BILIRUBIN, URINE: NEGATIVE
POC BLOOD, URINE: NEGATIVE
POC GLUCOSE, URINE: NEGATIVE MG/DL
POC KETONES, URINE: NEGATIVE MG/DL
POC LEUKOCYTES, URINE: ABNORMAL
POC NITRITE,URINE: NEGATIVE
POC PH, URINE: 6 PH
POC PROTEIN, URINE: NEGATIVE MG/DL
POC SPECIFIC GRAVITY, URINE: 1.01
POC UROBILINOGEN, URINE: 0.2 EU/DL

## 2025-05-15 PROCEDURE — 1036F TOBACCO NON-USER: CPT | Performed by: OBSTETRICS & GYNECOLOGY

## 2025-05-15 PROCEDURE — 99213 OFFICE O/P EST LOW 20 MIN: CPT | Performed by: OBSTETRICS & GYNECOLOGY

## 2025-05-15 ASSESSMENT — PATIENT HEALTH QUESTIONNAIRE - PHQ9
1. LITTLE INTEREST OR PLEASURE IN DOING THINGS: NOT AT ALL
SUM OF ALL RESPONSES TO PHQ9 QUESTIONS 1 AND 2: 0
2. FEELING DOWN, DEPRESSED OR HOPELESS: NOT AT ALL

## 2025-05-15 NOTE — PROGRESS NOTES
Subjective   Patient ID [id number]   Miriam Landau is a 29 y.o. at 36w4d with a working estimated date of delivery of Estimated Date of Delivery: 6/8/25 She denies vaginal bleeding, leakage of fluid, decreased fetal movements, and contractions.      Assessment/Plan    1) IUP 36w4d   Continue prenatal vitamin.  Labs: GBS today  Repeat blood pressure today    2) grand multipara    Follow-up in 1 wk

## 2025-05-18 LAB — GP B STREP SPEC QL CULT: NORMAL

## 2025-05-21 ENCOUNTER — APPOINTMENT (OUTPATIENT)
Dept: OBSTETRICS AND GYNECOLOGY | Facility: CLINIC | Age: 30
End: 2025-05-21
Payer: COMMERCIAL

## 2025-05-21 ENCOUNTER — APPOINTMENT (OUTPATIENT)
Facility: CLINIC | Age: 30
End: 2025-05-21
Payer: COMMERCIAL

## 2025-05-21 PROBLEM — Z87.59 HISTORY OF SHOULDER DYSTOCIA IN PRIOR PREGNANCY: Status: ACTIVE | Noted: 2025-05-21

## 2025-05-21 PROBLEM — Z3A.37 37 WEEKS GESTATION OF PREGNANCY (HHS-HCC): Status: ACTIVE | Noted: 2024-12-29

## 2025-05-21 NOTE — PROGRESS NOTES
Ob Follow-up    SUBJECTIVE      HPI: Miriam Landau is a 29 y.o.  at 37w4d here for RPNV.  She denies vaginal bleeding, leakage of fluid, decreased fetal movements, and contractions.       OBJECTIVE  Visit Vitals  /84   Wt 101 kg (223 lb)   LMP 2024   BMI 37.11 kg/m²   OB Status Pregnant   Smoking Status Never   BSA 2.15 m²      See OB flowsheet      ASSESSMENT & PLAN    Miriam Landau is a 29 y.o.  at 37w4d here for the following concerns we addressed today:    Problem List Items Addressed This Visit          Ob-Gyn Problems    History of shoulder dystocia in prior pregnancy    Overview   With first pregnancy, followed by 4 uncomplicated vaginal deliveries         37 weeks gestation of pregnancy ([facility]) - Primary    Overview   Desired provider in labor: [] CNM  [] Physician  [x] Blood Products: [x] Yes, accepts [] No, needs counseling  [x] Initial BMI: 34.61   [] Prenatal Labs:   [] Cervical Cancer Screening up to date  [x] Rh status:  Positive   [] Genetic Screening:    [] NT US: (11-13 wks)  [x] Baby ASA:  Yes  [] Pregnancy dated by:     [] Anatomy US: (19-20 wks)  [] 1hr GCT at 24-28wks:  [] Fetal Surveillance (if indicated):  [] Tdap (27-32 wks, may be given up to 36 wks if initial window missed):   [] RSV (32-36 wks) (Sept. to end of ):   [] Flu Vaccine:    [x] Breastfeeding: yes  [] Postpartum Birth control method:   [] GBS at 36 - 37 wks:  [x] 39 weeks discussion of IOL vs. Expectant management: Hoping for spontaneous labor  [x] Mode of delivery:  Vaginal              No orders of the defined types were placed in this encounter.       RTC in 1 week      Andre Escalante MD

## 2025-05-22 ENCOUNTER — APPOINTMENT (OUTPATIENT)
Dept: OBSTETRICS AND GYNECOLOGY | Facility: CLINIC | Age: 30
End: 2025-05-22
Payer: COMMERCIAL

## 2025-05-22 VITALS — DIASTOLIC BLOOD PRESSURE: 84 MMHG | SYSTOLIC BLOOD PRESSURE: 129 MMHG | WEIGHT: 223 LBS | BODY MASS INDEX: 37.11 KG/M2

## 2025-05-22 DIAGNOSIS — Z3A.37 37 WEEKS GESTATION OF PREGNANCY (HHS-HCC): Primary | ICD-10-CM

## 2025-05-22 DIAGNOSIS — Z87.59 HISTORY OF SHOULDER DYSTOCIA IN PRIOR PREGNANCY: ICD-10-CM

## 2025-05-22 PROCEDURE — 99213 OFFICE O/P EST LOW 20 MIN: CPT | Performed by: OBSTETRICS & GYNECOLOGY

## 2025-05-27 ENCOUNTER — APPOINTMENT (OUTPATIENT)
Dept: OBSTETRICS AND GYNECOLOGY | Facility: CLINIC | Age: 30
End: 2025-05-27
Payer: COMMERCIAL

## 2025-05-27 ENCOUNTER — APPOINTMENT (OUTPATIENT)
Facility: CLINIC | Age: 30
End: 2025-05-27
Payer: COMMERCIAL

## 2025-05-29 ENCOUNTER — APPOINTMENT (OUTPATIENT)
Dept: OBSTETRICS AND GYNECOLOGY | Facility: CLINIC | Age: 30
End: 2025-05-29
Payer: COMMERCIAL

## 2025-06-04 PROBLEM — Z3A.39 39 WEEKS GESTATION OF PREGNANCY (HHS-HCC): Status: ACTIVE | Noted: 2024-12-29

## 2025-06-05 ENCOUNTER — APPOINTMENT (OUTPATIENT)
Dept: OBSTETRICS AND GYNECOLOGY | Facility: CLINIC | Age: 30
End: 2025-06-05
Payer: COMMERCIAL

## 2025-06-05 VITALS — DIASTOLIC BLOOD PRESSURE: 60 MMHG | WEIGHT: 230 LBS | BODY MASS INDEX: 38.27 KG/M2 | SYSTOLIC BLOOD PRESSURE: 130 MMHG

## 2025-06-05 DIAGNOSIS — Z3A.39 39 WEEKS GESTATION OF PREGNANCY (HHS-HCC): ICD-10-CM

## 2025-06-05 DIAGNOSIS — Z36.9 PRENATAL SCREENING ENCOUNTER: Primary | ICD-10-CM

## 2025-06-05 NOTE — PROGRESS NOTES
Subjective   Patient ID [id number]   Miriam Landau is a 29 y.o. at 39w4d with a working estimated date of delivery of Estimated Date of Delivery: 6/8/25 She denies vaginal bleeding, leakage of fluid, decreased fetal movements, and contractions.      Assessment/Plan    1) IUP 39w4d   Continue prenatal vitamin.  Labs: GBS negative  Induction order placed for early next week    2) Grand multipara para        Follow-up postpartum

## 2025-06-06 ENCOUNTER — TELEPHONE (OUTPATIENT)
Dept: OBSTETRICS AND GYNECOLOGY | Facility: CLINIC | Age: 30
End: 2025-06-06
Payer: COMMERCIAL

## 2025-06-06 NOTE — TELEPHONE ENCOUNTER
Spoke with Miriam Landau and advised her that induction is set for 06/09/2025 at 8pm. She is going to call back to see because she would like to potential move induction back to try to go on her own.    Fartun Helton MA;

## 2025-06-06 NOTE — TELEPHONE ENCOUNTER
Pt advised that hospitals only date avaliable for induction would be Monday 06/09 at 8pm.   She would like to know if she can switch to [facility] for later induction date.

## 2025-06-10 ENCOUNTER — TELEPHONE (OUTPATIENT)
Dept: OBSTETRICS AND GYNECOLOGY | Facility: CLINIC | Age: 30
End: 2025-06-10
Payer: COMMERCIAL

## 2025-06-10 ENCOUNTER — ANESTHESIA EVENT (OUTPATIENT)
Dept: OBSTETRICS AND GYNECOLOGY | Facility: HOSPITAL | Age: 30
End: 2025-06-10
Payer: COMMERCIAL

## 2025-06-10 ENCOUNTER — ANESTHESIA (OUTPATIENT)
Dept: OBSTETRICS AND GYNECOLOGY | Facility: HOSPITAL | Age: 30
End: 2025-06-10
Payer: COMMERCIAL

## 2025-06-10 ENCOUNTER — HOSPITAL ENCOUNTER (INPATIENT)
Facility: HOSPITAL | Age: 30
LOS: 2 days | Discharge: HOME | End: 2025-06-12
Attending: OBSTETRICS & GYNECOLOGY | Admitting: OBSTETRICS & GYNECOLOGY
Payer: COMMERCIAL

## 2025-06-10 LAB
ABO GROUP (TYPE) IN BLOOD: NORMAL
ANTIBODY SCREEN: NORMAL
ERYTHROCYTE [DISTWIDTH] IN BLOOD BY AUTOMATED COUNT: 14.7 % (ref 11.5–14.5)
HCT VFR BLD AUTO: 34.5 % (ref 36–46)
HGB BLD-MCNC: 10.8 G/DL (ref 12–16)
MCH RBC QN AUTO: 28 PG (ref 26–34)
MCHC RBC AUTO-ENTMCNC: 31.3 G/DL (ref 32–36)
MCV RBC AUTO: 89 FL (ref 80–100)
NRBC BLD-RTO: 0 /100 WBCS (ref 0–0)
PLATELET # BLD AUTO: 156 X10*3/UL (ref 150–450)
RBC # BLD AUTO: 3.86 X10*6/UL (ref 4–5.2)
RH FACTOR (ANTIGEN D): NORMAL
WBC # BLD AUTO: 10 X10*3/UL (ref 4.4–11.3)

## 2025-06-10 PROCEDURE — 2500000004 HC RX 250 GENERAL PHARMACY W/ HCPCS (ALT 636 FOR OP/ED): Performed by: OBSTETRICS & GYNECOLOGY

## 2025-06-10 PROCEDURE — 1220000001 HC OB SEMI-PRIVATE ROOM DAILY

## 2025-06-10 PROCEDURE — 85027 COMPLETE CBC AUTOMATED: CPT | Performed by: OBSTETRICS & GYNECOLOGY

## 2025-06-10 PROCEDURE — 01967 NEURAXL LBR ANES VAG DLVR: CPT | Performed by: NURSE ANESTHETIST, CERTIFIED REGISTERED

## 2025-06-10 PROCEDURE — 36415 COLL VENOUS BLD VENIPUNCTURE: CPT | Performed by: OBSTETRICS & GYNECOLOGY

## 2025-06-10 PROCEDURE — 86780 TREPONEMA PALLIDUM: CPT | Mod: AHULAB | Performed by: OBSTETRICS & GYNECOLOGY

## 2025-06-10 PROCEDURE — 2500000004 HC RX 250 GENERAL PHARMACY W/ HCPCS (ALT 636 FOR OP/ED): Mod: JZ | Performed by: OBSTETRICS & GYNECOLOGY

## 2025-06-10 PROCEDURE — 86901 BLOOD TYPING SEROLOGIC RH(D): CPT | Performed by: OBSTETRICS & GYNECOLOGY

## 2025-06-10 PROCEDURE — 3E033VJ INTRODUCTION OF OTHER HORMONE INTO PERIPHERAL VEIN, PERCUTANEOUS APPROACH: ICD-10-PCS | Performed by: ADVANCED PRACTICE MIDWIFE

## 2025-06-10 RX ORDER — METHYLERGONOVINE MALEATE 0.2 MG/ML
0.2 INJECTION INTRAVENOUS ONCE AS NEEDED
Status: DISCONTINUED | OUTPATIENT
Start: 2025-06-10 | End: 2025-06-11

## 2025-06-10 RX ORDER — HYDRALAZINE HYDROCHLORIDE 20 MG/ML
5 INJECTION INTRAMUSCULAR; INTRAVENOUS ONCE AS NEEDED
Status: DISCONTINUED | OUTPATIENT
Start: 2025-06-10 | End: 2025-06-11

## 2025-06-10 RX ORDER — OXYTOCIN/0.9 % SODIUM CHLORIDE 30/500 ML
60 PLASTIC BAG, INJECTION (ML) INTRAVENOUS ONCE AS NEEDED
Status: DISCONTINUED | OUTPATIENT
Start: 2025-06-10 | End: 2025-06-11

## 2025-06-10 RX ORDER — FLUTICASONE PROPIONATE 50 MCG
2 SPRAY, SUSPENSION (ML) NASAL 2 TIMES DAILY
Status: DISCONTINUED | OUTPATIENT
Start: 2025-06-10 | End: 2025-06-12 | Stop reason: HOSPADM

## 2025-06-10 RX ORDER — ONDANSETRON 4 MG/1
4 TABLET, FILM COATED ORAL EVERY 6 HOURS PRN
Status: DISCONTINUED | OUTPATIENT
Start: 2025-06-10 | End: 2025-06-11

## 2025-06-10 RX ORDER — CARBOPROST TROMETHAMINE 250 UG/ML
250 INJECTION, SOLUTION INTRAMUSCULAR ONCE AS NEEDED
Status: DISCONTINUED | OUTPATIENT
Start: 2025-06-10 | End: 2025-06-11

## 2025-06-10 RX ORDER — FENTANYL/ROPIVACAINE/NS/PF 2MCG/ML-.2
0-25 PLASTIC BAG, INJECTION (ML) INJECTION CONTINUOUS
Status: DISCONTINUED | OUTPATIENT
Start: 2025-06-11 | End: 2025-06-11

## 2025-06-10 RX ORDER — OXYTOCIN 10 [USP'U]/ML
10 INJECTION, SOLUTION INTRAMUSCULAR; INTRAVENOUS ONCE AS NEEDED
Status: DISCONTINUED | OUTPATIENT
Start: 2025-06-10 | End: 2025-06-11

## 2025-06-10 RX ORDER — LOPERAMIDE HYDROCHLORIDE 2 MG/1
4 CAPSULE ORAL EVERY 2 HOUR PRN
Status: DISCONTINUED | OUTPATIENT
Start: 2025-06-10 | End: 2025-06-11

## 2025-06-10 RX ORDER — TERBUTALINE SULFATE 1 MG/ML
0.25 INJECTION SUBCUTANEOUS ONCE AS NEEDED
Status: DISCONTINUED | OUTPATIENT
Start: 2025-06-10 | End: 2025-06-11

## 2025-06-10 RX ORDER — OXYTOCIN/0.9 % SODIUM CHLORIDE 30/500 ML
2-30 PLASTIC BAG, INJECTION (ML) INTRAVENOUS CONTINUOUS
Status: DISCONTINUED | OUTPATIENT
Start: 2025-06-10 | End: 2025-06-11

## 2025-06-10 RX ORDER — OXYTOCIN/0.9 % SODIUM CHLORIDE 30/500 ML
60 PLASTIC BAG, INJECTION (ML) INTRAVENOUS ONCE AS NEEDED
Status: COMPLETED | OUTPATIENT
Start: 2025-06-10 | End: 2025-06-11

## 2025-06-10 RX ORDER — LABETALOL HYDROCHLORIDE 5 MG/ML
20 INJECTION, SOLUTION INTRAVENOUS ONCE AS NEEDED
Status: DISCONTINUED | OUTPATIENT
Start: 2025-06-10 | End: 2025-06-11

## 2025-06-10 RX ORDER — SODIUM CHLORIDE, SODIUM LACTATE, POTASSIUM CHLORIDE, CALCIUM CHLORIDE 600; 310; 30; 20 MG/100ML; MG/100ML; MG/100ML; MG/100ML
75 INJECTION, SOLUTION INTRAVENOUS CONTINUOUS
Status: DISCONTINUED | OUTPATIENT
Start: 2025-06-10 | End: 2025-06-11

## 2025-06-10 RX ORDER — MISOPROSTOL 200 UG/1
800 TABLET ORAL ONCE AS NEEDED
Status: DISCONTINUED | OUTPATIENT
Start: 2025-06-10 | End: 2025-06-11

## 2025-06-10 RX ORDER — CETIRIZINE HYDROCHLORIDE 10 MG/1
10 TABLET ORAL DAILY
Status: DISCONTINUED | OUTPATIENT
Start: 2025-06-10 | End: 2025-06-12 | Stop reason: HOSPADM

## 2025-06-10 RX ORDER — LIDOCAINE HYDROCHLORIDE 10 MG/ML
20 INJECTION, SOLUTION INFILTRATION; PERINEURAL ONCE AS NEEDED
Status: DISCONTINUED | OUTPATIENT
Start: 2025-06-10 | End: 2025-06-11

## 2025-06-10 RX ORDER — CALCIUM CARBONATE 200(500)MG
1 TABLET,CHEWABLE ORAL EVERY 6 HOURS PRN
Status: DISCONTINUED | OUTPATIENT
Start: 2025-06-10 | End: 2025-06-11

## 2025-06-10 RX ORDER — TRANEXAMIC ACID 1 G/10ML
1000 INJECTION, SOLUTION INTRAVENOUS ONCE AS NEEDED
Status: DISCONTINUED | OUTPATIENT
Start: 2025-06-10 | End: 2025-06-11

## 2025-06-10 RX ORDER — ONDANSETRON HYDROCHLORIDE 2 MG/ML
4 INJECTION, SOLUTION INTRAVENOUS EVERY 6 HOURS PRN
Status: DISCONTINUED | OUTPATIENT
Start: 2025-06-10 | End: 2025-06-11

## 2025-06-10 RX ADMIN — SODIUM CHLORIDE, SODIUM LACTATE, POTASSIUM CHLORIDE, AND CALCIUM CHLORIDE 500 ML: .6; .31; .03; .02 INJECTION, SOLUTION INTRAVENOUS at 23:45

## 2025-06-10 RX ADMIN — SODIUM CHLORIDE, SODIUM LACTATE, POTASSIUM CHLORIDE, AND CALCIUM CHLORIDE 75 ML/HR: .6; .31; .03; .02 INJECTION, SOLUTION INTRAVENOUS at 16:38

## 2025-06-10 RX ADMIN — Medication 2 MILLI-UNITS/MIN: at 16:38

## 2025-06-10 SDOH — HEALTH STABILITY: MENTAL HEALTH: SUICIDAL BEHAVIOR (LIFETIME): NO

## 2025-06-10 SDOH — ECONOMIC STABILITY: HOUSING INSECURITY: DO YOU FEEL UNSAFE GOING BACK TO THE PLACE WHERE YOU ARE LIVING?: YES

## 2025-06-10 SDOH — HEALTH STABILITY: MENTAL HEALTH: WISH TO BE DEAD (PAST 1 MONTH): NO

## 2025-06-10 SDOH — HEALTH STABILITY: MENTAL HEALTH
DO YOU FEEL STRESS - TENSE, RESTLESS, NERVOUS, OR ANXIOUS, OR UNABLE TO SLEEP AT NIGHT BECAUSE YOUR MIND IS TROUBLED ALL THE TIME - THESE DAYS?: PATIENT DECLINED

## 2025-06-10 SDOH — SOCIAL STABILITY: SOCIAL INSECURITY: DO YOU FEEL ANYONE HAS EXPLOITED OR TAKEN ADVANTAGE OF YOU FINANCIALLY OR OF YOUR PERSONAL PROPERTY?: NO

## 2025-06-10 SDOH — HEALTH STABILITY: MENTAL HEALTH: WERE YOU ABLE TO COMPLETE ALL THE BEHAVIORAL HEALTH SCREENINGS?: YES

## 2025-06-10 SDOH — SOCIAL STABILITY: SOCIAL INSECURITY
WITHIN THE LAST YEAR, HAVE YOU BEEN KICKED, HIT, SLAPPED, OR OTHERWISE PHYSICALLY HURT BY YOUR PARTNER OR EX-PARTNER?: NO

## 2025-06-10 SDOH — HEALTH STABILITY: MENTAL HEALTH: NON-SPECIFIC ACTIVE SUICIDAL THOUGHTS (PAST 1 MONTH): NO

## 2025-06-10 SDOH — ECONOMIC STABILITY: FOOD INSECURITY: WITHIN THE PAST 12 MONTHS, YOU WORRIED THAT YOUR FOOD WOULD RUN OUT BEFORE YOU GOT THE MONEY TO BUY MORE.: NEVER TRUE

## 2025-06-10 SDOH — ECONOMIC STABILITY: FOOD INSECURITY: HOW HARD IS IT FOR YOU TO PAY FOR THE VERY BASICS LIKE FOOD, HOUSING, MEDICAL CARE, AND HEATING?: NOT HARD AT ALL

## 2025-06-10 SDOH — SOCIAL STABILITY: SOCIAL INSECURITY: WITHIN THE LAST YEAR, HAVE YOU BEEN HUMILIATED OR EMOTIONALLY ABUSED IN OTHER WAYS BY YOUR PARTNER OR EX-PARTNER?: NO

## 2025-06-10 SDOH — ECONOMIC STABILITY: FOOD INSECURITY: WITHIN THE PAST 12 MONTHS, THE FOOD YOU BOUGHT JUST DIDN'T LAST AND YOU DIDN'T HAVE MONEY TO GET MORE.: NEVER TRUE

## 2025-06-10 SDOH — HEALTH STABILITY: MENTAL HEALTH: CURRENT SMOKER: 0

## 2025-06-10 SDOH — SOCIAL STABILITY: SOCIAL INSECURITY
WITHIN THE LAST YEAR, HAVE YOU BEEN RAPED OR FORCED TO HAVE ANY KIND OF SEXUAL ACTIVITY BY YOUR PARTNER OR EX-PARTNER?: NO

## 2025-06-10 SDOH — ECONOMIC STABILITY: HOUSING INSECURITY: DO YOU FEEL UNSAFE GOING BACK TO THE PLACE WHERE YOU ARE LIVING?: NO

## 2025-06-10 SDOH — ECONOMIC STABILITY: TRANSPORTATION INSECURITY: IN THE PAST 12 MONTHS, HAS LACK OF TRANSPORTATION KEPT YOU FROM MEDICAL APPOINTMENTS OR FROM GETTING MEDICATIONS?: NO

## 2025-06-10 SDOH — ECONOMIC STABILITY: FOOD INSECURITY
WITHIN THE PAST 12 MONTHS, YOU WORRIED THAT YOUR FOOD WOULD RUN OUT BEFORE YOU GOT THE MONEY TO BUY MORE.: PATIENT DECLINED

## 2025-06-10 SDOH — SOCIAL STABILITY: SOCIAL INSECURITY: HAVE YOU HAD THOUGHTS OF HARMING ANYONE ELSE?: NO

## 2025-06-10 SDOH — SOCIAL STABILITY: SOCIAL INSECURITY: ABUSE SCREEN: ADULT

## 2025-06-10 SDOH — ECONOMIC STABILITY: FOOD INSECURITY: WITHIN THE PAST 12 MONTHS, THE FOOD YOU BOUGHT JUST DIDN'T LAST AND YOU DIDN'T HAVE MONEY TO GET MORE.: PATIENT DECLINED

## 2025-06-10 SDOH — SOCIAL STABILITY: SOCIAL INSECURITY: ARE THERE ANY APPARENT SIGNS OF INJURIES/BEHAVIORS THAT COULD BE RELATED TO ABUSE/NEGLECT?: NO

## 2025-06-10 SDOH — SOCIAL STABILITY: SOCIAL INSECURITY: WITHIN THE LAST YEAR, HAVE YOU BEEN AFRAID OF YOUR PARTNER OR EX-PARTNER?: NO

## 2025-06-10 SDOH — SOCIAL STABILITY: SOCIAL INSECURITY: VERBAL ABUSE: DENIES

## 2025-06-10 SDOH — SOCIAL STABILITY: SOCIAL INSECURITY: ARE YOU OR HAVE YOU BEEN THREATENED OR ABUSED PHYSICALLY, EMOTIONALLY, OR SEXUALLY BY ANYONE?: NO

## 2025-06-10 SDOH — HEALTH STABILITY: MENTAL HEALTH: HAVE YOU USED ANY PRESCRIPTION DRUGS OTHER THAN PRESCRIBED IN THE PAST 12 MONTHS?: NO

## 2025-06-10 SDOH — SOCIAL STABILITY: SOCIAL INSECURITY: HAVE YOU HAD ANY THOUGHTS OF HARMING ANYONE ELSE?: NO

## 2025-06-10 SDOH — SOCIAL STABILITY: SOCIAL INSECURITY: PHYSICAL ABUSE: DENIES

## 2025-06-10 SDOH — SOCIAL STABILITY: SOCIAL INSECURITY: HAS ANYONE EVER THREATENED TO HURT YOUR FAMILY OR YOUR PETS?: NO

## 2025-06-10 SDOH — HEALTH STABILITY: MENTAL HEALTH: HAVE YOU USED ANY SUBSTANCES (CANABIS, COCAINE, HEROIN, HALLUCINOGENS, INHALANTS, ETC.) IN THE PAST 12 MONTHS?: NO

## 2025-06-10 SDOH — SOCIAL STABILITY: SOCIAL INSECURITY: DOES ANYONE TRY TO KEEP YOU FROM HAVING/CONTACTING OTHER FRIENDS OR DOING THINGS OUTSIDE YOUR HOME?: NO

## 2025-06-10 ASSESSMENT — PATIENT HEALTH QUESTIONNAIRE - PHQ9
SUM OF ALL RESPONSES TO PHQ9 QUESTIONS 1 & 2: 0
1. LITTLE INTEREST OR PLEASURE IN DOING THINGS: NOT AT ALL
2. FEELING DOWN, DEPRESSED OR HOPELESS: NOT AT ALL

## 2025-06-10 ASSESSMENT — LIFESTYLE VARIABLES
HOW OFTEN DO YOU HAVE 6 OR MORE DRINKS ON ONE OCCASION: NEVER
AUDIT-C TOTAL SCORE: 0
HOW OFTEN DO YOU HAVE A DRINK CONTAINING ALCOHOL: NEVER
AUDIT-C TOTAL SCORE: 0
HOW MANY STANDARD DRINKS CONTAINING ALCOHOL DO YOU HAVE ON A TYPICAL DAY: PATIENT DOES NOT DRINK
SKIP TO QUESTIONS 9-10: 1

## 2025-06-10 ASSESSMENT — ACTIVITIES OF DAILY LIVING (ADL)
LACK_OF_TRANSPORTATION: NO
LACK_OF_TRANSPORTATION: PATIENT DECLINED

## 2025-06-10 ASSESSMENT — PAIN SCALES - GENERAL
PAINLEVEL_OUTOF10: 2
PAINLEVEL_OUTOF10: 0 - NO PAIN
PAINLEVEL_OUTOF10: 3

## 2025-06-10 NOTE — TELEPHONE ENCOUNTER
Patient called in stated that she is feeling a lot of discomfort.Patient feels she is having contractions.Patient stated the contractions comes 6-7 minutes and then 10-20 minutes.Patient stated no drainage but not sure.Patient is 40 weeks and 2 days pregnant.      Verena Guillermo CNA

## 2025-06-10 NOTE — CARE PLAN
The patient's goals for the shift include      The clinical goals for the shift include safe delivery

## 2025-06-10 NOTE — ANESTHESIA PREPROCEDURE EVALUATION
Patient: Miriam Landau    Evaluation Method: In-person visit    Procedure Information    Date: 06/10/25  Procedure: Labor Consult         Relevant Problems   Anesthesia   (+) Other complications of spinal and epidural anesthesia during labor and delivery ([facility])      Cardiac (within normal limits)      Pulmonary (within normal limits)      Neuro (within normal limits)      GI (within normal limits)      /Renal (within normal limits)      Liver (within normal limits)      Endocrine (within normal limits)      Hematology (within normal limits)      Musculoskeletal (within normal limits)      HEENT (within normal limits)      ID (within normal limits)      Skin (within normal limits)      GYN   (+) 39 weeks gestation of pregnancy ([facility])       Clinical information reviewed:   Tobacco  Allergies  Meds   Med Hx  Surg Hx   Fam Hx          NPO Detail:  NPO/Void Status  Date of Last Liquid: 06/10/25  Time of Last Liquid: 0900  Date of Last Solid: 06/10/25  Time of Last Solid: 0900         OB/Gyn Evaluation    Present Pregnancy    Patient is pregnant now.   Obstetric History                Physical Exam    Airway  Mallampati: I  TM distance: >3 FB  Mouth opening: 3 or more finger widths     Cardiovascular - normal exam  Rhythm: regular  Rate: normal     Dental - normal exam       Pulmonary - normal exam   Abdominal - normal exam           Anesthesia Plan    History of general anesthesia?: yes  History of complications of general anesthesia?: no    ASA 2     epidural     The patient is not a current smoker.    Anesthetic plan and risks discussed with patient and mother.  Use of blood products discussed with patient and mother who consented to blood products.    Plan discussed with CRNA.

## 2025-06-10 NOTE — H&P
Obstetrical Admission History and Physical     Miriam Landau is a 29 y.o.  at 40w2d. ALISE: 2025, by Last Menstrual Period. Estimated fetal weight: 8# by Leopold's. She has had prenatal care with Dr. Desouza.    Chief Complaint: Contractions    Assessment/Plan    -IUP at 40.2 wks  -cat 2 FHT  -GBS negative  -h/o shoulder dystocia     Plan:  -Reviewed recommendation for IOL at this time given cat II tracing at 40.2 wks and Avril is agreeable to this plan.   -Admit for IOL.   -Routine labs and orders.  -Will begin IOL with pitocin.   -Dr. Escalante of admission and plan.     ASTRID Braswell-CNM    Assessment & Plan        Pregnancy Problems (from 24 to present)       Problem Noted Diagnosed Resolved    39 weeks gestation of pregnancy ([facility]) 2024 by Fartun Helton MA  No    Priority:  Medium       Overview Addendum 2025 11:21 AM by Andre Escalante MD   Desired provider in labor: [] CNM  [] Physician  [x] Blood Products: [x] Yes, accepts [] No, needs counseling  [x] Initial BMI: 34.61   [x] Prenatal Labs: Normal  [x] Cervical Cancer Screenin2024 negative  [x] Rh status:  Positive   [x] Genetic Screening:  Declines  [x] NT US: (11-13 wks) not done  [x] Baby ASA:  Yes  [x] Pregnancy dated by: LMP c/w 19w US    [x] Anatomy US: (19-20 wks) Normal  [x] 1hr GCT at 24-28wks: Normal  [] Fetal Surveillance (if indicated):  [] Tdap Vaccine:    [] RSV (32-36 wks) (Sept. to end of ):   [] Flu Vaccine:    [x] Breastfeeding: yes  [] Postpartum Birth control method:   [] GBS at 36 - 37 wks:  [x] 39 weeks discussion of IOL vs. Expectant management: Hoping for spontaneous labor  [x] Mode of delivery:  Vaginal               Options for delivery have been discussed with the patient and she elects for an induction of labor.  Cervical ripening with cytotec, cervidil, other prostaglandin agents has been discussed.  Induction of labor with pitocin, amniotomy, cytotec, and cervical balloon have been discussed in  detail. The risks, benefits, complications, alternatives, expected outcomes, potential problems during recuperation and recovery, and the risks of not performing the procedure were discussed with the patient. The patient stated understanding that the risks of delivery include, but are not limited to: death; reaction to medications; injury to bowel, bladder, ureters, uterus, cervix, vagina, and other pelvic and abdominal structures, infection; blood loss and possible need for transfusion; and potential need for surgery, including hysterectomy. The risks of injury to the infant during delivery were also discussed. All questions were answered. There was concurrence with the planned procedure, and the patient wanted to proceed.    Admit to inpatient status. I anticipate that this patient will require a stay exceeding at least 2 midnights for delivery and postpartum.  Induction of labor.  Management of pregnancy complications, as indicated.    Subjective   Avril presents to triage reporting contractions for the past several days that have become more painful since last night. States she is uncertain whether her water is broken as she noticed some mucousy discharge earlier today. Endorses good FM.     Reason for Induction of Labor:  Pregnancy at 39 weeks or greater for induction    Cat 2 FHT     Obstetrical History   OB History    Para Term  AB Living   5 4    5   SAB IAB Ectopic Multiple Live Births       4      # Outcome Date GA Lbr Bobby/2nd Weight Sex Type Anes PTL Lv   5 Current            4 Para /    M Vag-Spont   VINNY   3 Para 21    M Vag-Spont   VINNY   2 Para 20    F Vag-Spont   VINNY      Complications: Preeclampsia ([facility])   1 Para 10//    M Vag-Spont   VINNY      Complications: Shoulder Dystocia       Past Medical History  Medical History[1]     Past Surgical History   Surgical History[2]    Social History  Social History     Tobacco Use    Smoking status: Never    Smokeless  "tobacco: Never   Substance Use Topics    Alcohol use: Never     Substance and Sexual Activity   Drug Use Never       Allergies  Patient has no known allergies.     Medications  Prescriptions Prior to Admission[3]    Objective    Last Vitals  Temp Pulse Resp BP MAP O2 Sat   36 °C (96.8 °F) 80 16 119/79 93 98 % (Simultaneous filing. User may not have seen previous data.)     Physical Examination  GENERAL: Examination reveals a well developed, well nourished, gravid female in no acute distress. She is alert and cooperative.  LUNGS: unlabored breathing  FHR is 140, moderate, +accels, +one late deceleration, +variable decelerations  [place] reading:  irregular   The fetus is in a vertex presentation, determined by ultrasound performed by Dr. Escalante at the bedside   CERVIX: 2 cm dilated, 50 % effaced, -3 station; MEMBRANES are Intact  SSE: negative pooling, negative nitrazine, negative ferning  NEUROLOGICAL: alert, oriented, normal speech, no focal findings or movement disorder noted  PSYCHOLOGICAL: awake and alert; oriented to person, place, and time    Lab Review  Labs in chart were reviewed.  No results found for: \"WBC\", \"HGB\", \"HCT\", \"PLT\"         [1]   Past Medical History:  Diagnosis Date    Mild intermittent asthma, uncomplicated ([facility])     Asthma, intermittent    Other specified disorders of nose and nasal sinuses 03/17/2019    Sinus pressure    Pelvic and perineal pain 02/05/2020    Vaginal pain   [2] History reviewed. No pertinent surgical history.  [3]   Medications Prior to Admission   Medication Sig Dispense Refill Last Dose/Taking    cetirizine (ZyrTEC) 10 mg capsule Take by mouth.   6/9/2025    -iron fum-folic acid 29 mg iron- 1 mg tablet Take 1 tablet by mouth once daily.   6/9/2025    fluticasone (Flonase) 50 mcg/actuation nasal spray Administer into affected nostril(s).   Unknown     "

## 2025-06-11 ENCOUNTER — PHARMACY VISIT (OUTPATIENT)
Dept: PHARMACY | Facility: CLINIC | Age: 30
End: 2025-06-11
Payer: MEDICAID

## 2025-06-11 LAB
HOLD SPECIMEN: NORMAL
HOLD SPECIMEN: NORMAL
TREPONEMA PALLIDUM IGG+IGM AB [PRESENCE] IN SERUM OR PLASMA BY IMMUNOASSAY: NONREACTIVE

## 2025-06-11 PROCEDURE — 0HQ9XZZ REPAIR PERINEUM SKIN, EXTERNAL APPROACH: ICD-10-PCS | Performed by: ADVANCED PRACTICE MIDWIFE

## 2025-06-11 PROCEDURE — 2500000004 HC RX 250 GENERAL PHARMACY W/ HCPCS (ALT 636 FOR OP/ED): Mod: JZ | Performed by: OBSTETRICS & GYNECOLOGY

## 2025-06-11 PROCEDURE — 59409 OBSTETRICAL CARE: CPT | Performed by: ADVANCED PRACTICE MIDWIFE

## 2025-06-11 PROCEDURE — 7100000016 HC LABOR RECOVERY PER HOUR

## 2025-06-11 PROCEDURE — 3700000014 EPIDURAL BLOCK: Performed by: NURSE ANESTHETIST, CERTIFIED REGISTERED

## 2025-06-11 PROCEDURE — 2500000004 HC RX 250 GENERAL PHARMACY W/ HCPCS (ALT 636 FOR OP/ED): Mod: JZ | Performed by: ADVANCED PRACTICE MIDWIFE

## 2025-06-11 PROCEDURE — 10907ZC DRAINAGE OF AMNIOTIC FLUID, THERAPEUTIC FROM PRODUCTS OF CONCEPTION, VIA NATURAL OR ARTIFICIAL OPENING: ICD-10-PCS | Performed by: OBSTETRICS & GYNECOLOGY

## 2025-06-11 PROCEDURE — 2720000007 HC OR 272 NO HCPCS

## 2025-06-11 PROCEDURE — 7210000002 HC LABOR PER HOUR

## 2025-06-11 PROCEDURE — 51701 INSERT BLADDER CATHETER: CPT

## 2025-06-11 PROCEDURE — RXMED WILLOW AMBULATORY MEDICATION CHARGE

## 2025-06-11 PROCEDURE — 1100000001 HC PRIVATE ROOM DAILY

## 2025-06-11 PROCEDURE — 2500000005 HC RX 250 GENERAL PHARMACY W/O HCPCS: Performed by: ADVANCED PRACTICE MIDWIFE

## 2025-06-11 PROCEDURE — 2500000004 HC RX 250 GENERAL PHARMACY W/ HCPCS (ALT 636 FOR OP/ED): Mod: JZ | Performed by: NURSE ANESTHETIST, CERTIFIED REGISTERED

## 2025-06-11 PROCEDURE — 59050 FETAL MONITOR W/REPORT: CPT

## 2025-06-11 PROCEDURE — 2500000001 HC RX 250 WO HCPCS SELF ADMINISTERED DRUGS (ALT 637 FOR MEDICARE OP): Performed by: ADVANCED PRACTICE MIDWIFE

## 2025-06-11 RX ORDER — ONDANSETRON 4 MG/1
4 TABLET, FILM COATED ORAL EVERY 6 HOURS PRN
Status: DISCONTINUED | OUTPATIENT
Start: 2025-06-11 | End: 2025-06-12 | Stop reason: HOSPADM

## 2025-06-11 RX ORDER — TRANEXAMIC ACID 1 G/10ML
1000 INJECTION, SOLUTION INTRAVENOUS ONCE AS NEEDED
Status: DISCONTINUED | OUTPATIENT
Start: 2025-06-11 | End: 2025-06-12 | Stop reason: HOSPADM

## 2025-06-11 RX ORDER — IBUPROFEN 600 MG/1
600 TABLET, FILM COATED ORAL EVERY 6 HOURS PRN
Qty: 30 TABLET | Refills: 0 | Status: SHIPPED | OUTPATIENT
Start: 2025-06-11

## 2025-06-11 RX ORDER — MISOPROSTOL 200 UG/1
800 TABLET ORAL ONCE AS NEEDED
Status: DISCONTINUED | OUTPATIENT
Start: 2025-06-11 | End: 2025-06-12 | Stop reason: HOSPADM

## 2025-06-11 RX ORDER — OXYTOCIN 10 [USP'U]/ML
10 INJECTION, SOLUTION INTRAMUSCULAR; INTRAVENOUS ONCE AS NEEDED
Status: DISCONTINUED | OUTPATIENT
Start: 2025-06-11 | End: 2025-06-12 | Stop reason: HOSPADM

## 2025-06-11 RX ORDER — ONDANSETRON HYDROCHLORIDE 2 MG/ML
4 INJECTION, SOLUTION INTRAVENOUS EVERY 6 HOURS PRN
Status: DISCONTINUED | OUTPATIENT
Start: 2025-06-11 | End: 2025-06-12 | Stop reason: HOSPADM

## 2025-06-11 RX ORDER — LOPERAMIDE HYDROCHLORIDE 2 MG/1
4 CAPSULE ORAL EVERY 2 HOUR PRN
Status: DISCONTINUED | OUTPATIENT
Start: 2025-06-11 | End: 2025-06-12 | Stop reason: HOSPADM

## 2025-06-11 RX ORDER — POLYETHYLENE GLYCOL 3350 17 G/17G
17 POWDER, FOR SOLUTION ORAL 2 TIMES DAILY PRN
Status: DISCONTINUED | OUTPATIENT
Start: 2025-06-11 | End: 2025-06-12 | Stop reason: HOSPADM

## 2025-06-11 RX ORDER — DIPHENHYDRAMINE HCL 25 MG
25 CAPSULE ORAL EVERY 6 HOURS PRN
Status: DISCONTINUED | OUTPATIENT
Start: 2025-06-11 | End: 2025-06-12 | Stop reason: HOSPADM

## 2025-06-11 RX ORDER — SIMETHICONE 80 MG
80 TABLET,CHEWABLE ORAL 4 TIMES DAILY PRN
Status: DISCONTINUED | OUTPATIENT
Start: 2025-06-11 | End: 2025-06-12 | Stop reason: HOSPADM

## 2025-06-11 RX ORDER — ADHESIVE BANDAGE
10 BANDAGE TOPICAL
Status: DISCONTINUED | OUTPATIENT
Start: 2025-06-11 | End: 2025-06-12 | Stop reason: HOSPADM

## 2025-06-11 RX ORDER — METHYLERGONOVINE MALEATE 0.2 MG/ML
0.2 INJECTION INTRAVENOUS ONCE AS NEEDED
Status: DISCONTINUED | OUTPATIENT
Start: 2025-06-11 | End: 2025-06-12 | Stop reason: HOSPADM

## 2025-06-11 RX ORDER — LABETALOL HYDROCHLORIDE 5 MG/ML
20 INJECTION, SOLUTION INTRAVENOUS ONCE AS NEEDED
Status: DISCONTINUED | OUTPATIENT
Start: 2025-06-11 | End: 2025-06-12 | Stop reason: HOSPADM

## 2025-06-11 RX ORDER — ACETAMINOPHEN 500 MG
1000 TABLET ORAL EVERY 6 HOURS PRN
Qty: 60 TABLET | Refills: 0 | Status: SHIPPED | OUTPATIENT
Start: 2025-06-11

## 2025-06-11 RX ORDER — OXYTOCIN/0.9 % SODIUM CHLORIDE 30/500 ML
60 PLASTIC BAG, INJECTION (ML) INTRAVENOUS ONCE AS NEEDED
Status: DISCONTINUED | OUTPATIENT
Start: 2025-06-11 | End: 2025-06-12 | Stop reason: HOSPADM

## 2025-06-11 RX ORDER — ACETAMINOPHEN 325 MG/1
975 TABLET ORAL EVERY 6 HOURS
Status: DISCONTINUED | OUTPATIENT
Start: 2025-06-11 | End: 2025-06-12 | Stop reason: HOSPADM

## 2025-06-11 RX ORDER — HYDRALAZINE HYDROCHLORIDE 20 MG/ML
5 INJECTION INTRAMUSCULAR; INTRAVENOUS ONCE AS NEEDED
Status: DISCONTINUED | OUTPATIENT
Start: 2025-06-11 | End: 2025-06-12 | Stop reason: HOSPADM

## 2025-06-11 RX ORDER — DIPHENHYDRAMINE HYDROCHLORIDE 50 MG/ML
25 INJECTION, SOLUTION INTRAMUSCULAR; INTRAVENOUS EVERY 6 HOURS PRN
Status: DISCONTINUED | OUTPATIENT
Start: 2025-06-11 | End: 2025-06-12 | Stop reason: HOSPADM

## 2025-06-11 RX ORDER — CARBOPROST TROMETHAMINE 250 UG/ML
250 INJECTION, SOLUTION INTRAMUSCULAR ONCE AS NEEDED
Status: DISCONTINUED | OUTPATIENT
Start: 2025-06-11 | End: 2025-06-12 | Stop reason: HOSPADM

## 2025-06-11 RX ORDER — IBUPROFEN 600 MG/1
600 TABLET, FILM COATED ORAL EVERY 6 HOURS
Status: DISCONTINUED | OUTPATIENT
Start: 2025-06-11 | End: 2025-06-12 | Stop reason: HOSPADM

## 2025-06-11 RX ORDER — ENOXAPARIN SODIUM 100 MG/ML
40 INJECTION SUBCUTANEOUS EVERY 24 HOURS
Status: DISCONTINUED | OUTPATIENT
Start: 2025-06-11 | End: 2025-06-12 | Stop reason: HOSPADM

## 2025-06-11 RX ADMIN — ACETAMINOPHEN 975 MG: 325 TABLET ORAL at 10:49

## 2025-06-11 RX ADMIN — IBUPROFEN 600 MG: 600 TABLET ORAL at 05:02

## 2025-06-11 RX ADMIN — ACETAMINOPHEN 975 MG: 325 TABLET ORAL at 05:02

## 2025-06-11 RX ADMIN — Medication 10 ML/HR: at 00:10

## 2025-06-11 RX ADMIN — ACETAMINOPHEN 975 MG: 325 TABLET ORAL at 17:26

## 2025-06-11 RX ADMIN — IBUPROFEN 600 MG: 600 TABLET ORAL at 17:26

## 2025-06-11 RX ADMIN — CETIRIZINE HYDROCHLORIDE 10 MG: 10 TABLET ORAL at 21:07

## 2025-06-11 RX ADMIN — Medication 1 EACH: at 05:19

## 2025-06-11 RX ADMIN — BENZOCAINE AND LEVOMENTHOL 1 APPLICATION: 200; 5 SPRAY TOPICAL at 05:19

## 2025-06-11 RX ADMIN — ENOXAPARIN SODIUM 40 MG: 40 INJECTION SUBCUTANEOUS at 15:22

## 2025-06-11 RX ADMIN — Medication 60 MILLI-UNITS/MIN: at 03:06

## 2025-06-11 RX ADMIN — IBUPROFEN 600 MG: 600 TABLET ORAL at 23:27

## 2025-06-11 RX ADMIN — ACETAMINOPHEN 975 MG: 325 TABLET ORAL at 23:27

## 2025-06-11 RX ADMIN — IBUPROFEN 600 MG: 600 TABLET ORAL at 10:49

## 2025-06-11 SDOH — HEALTH STABILITY: MENTAL HEALTH: CURRENT SMOKER: 0

## 2025-06-11 ASSESSMENT — PAIN SCALES - GENERAL
PAINLEVEL_OUTOF10: 3
PAINLEVEL_OUTOF10: 3
PAINLEVEL_OUTOF10: 1
PAINLEVEL_OUTOF10: 0 - NO PAIN
PAINLEVEL_OUTOF10: 3
PAIN_LEVEL: 0
PAINLEVEL_OUTOF10: 0 - NO PAIN

## 2025-06-11 ASSESSMENT — PAIN DESCRIPTION - DESCRIPTORS
DESCRIPTORS: CRAMPING

## 2025-06-11 ASSESSMENT — PAIN - FUNCTIONAL ASSESSMENT: PAIN_FUNCTIONAL_ASSESSMENT: 0-10

## 2025-06-11 NOTE — SIGNIFICANT EVENT
Fetal heart tones reassuring category 1 at 150 bpm  Vaginal exam 3/50/-2  Latent labor.  Increase ambulation.  Recheck in 2 hours for possible AROM

## 2025-06-11 NOTE — LACTATION NOTE
Lactation Consultant Note  Lactation Consultation  Reason for Consult: Initial assessment  Consultant Name: Lore FARR    Maternal Information  Has mother  before?: Yes  Infant to breast within first 2 hours of birth?: Yes  Exclusive Pump and Bottle Feed: No    Maternal Assessment       Infant Assessment       Feeding Assessment  Nutrition Source: Breastmilk  Feeding Method: Nursing at the breast    LATCH TOOL       Breast Pump       Other OB Lactation Tools       Patient Follow-up  Inpatient Lactation Follow-up Needed :  (as needed)    Other OB Lactation Documentation       Recommendations/Summary  Mom is an experienced breast feeder. Mom reports that feeds are going well. Mom is able to latch baby independently. We reviewed some breastfeeding education. Mom has no further questions at this time. Outpatient lactation discussed. Mom will follow up as needed.

## 2025-06-11 NOTE — NURSING NOTE
2315 Pt requesting epidural, bolus started, & OB anesthesia called  2346 Timeout  0005 test dose  0010 Pt placed in left tilt

## 2025-06-11 NOTE — ANESTHESIA POSTPROCEDURE EVALUATION
"Patient: Miriam Landau    Procedure Summary       Date: 06/10/25 Room / Location:     Anesthesia Start: 2350 Anesthesia Stop: 06/11/25 0236    Procedure: Labor Analgesia Diagnosis:     Scheduled Providers:  Responsible Provider: NICKY Gardner    Anesthesia Type: epidural ASA Status: 2            Anesthesia Type: epidural    Vitals Value Taken Time   /62   Pulse 85   Temp 36.5 °C (97.7 °F) (Temporal)   Resp 18   Ht 1.651 m (5' 5\")   Wt 104 kg (228 lb 13.4 oz)   LMP 09/01/2024   SpO2 98%   Breastfeeding Yes   BMI 38.08 kg/m²      Anesthesia Post Evaluation    Patient location during evaluation: bedside  Patient participation: complete - patient participated  Level of consciousness: awake and alert  Pain score: 0  Pain management: adequate  Airway patency: patent  Cardiovascular status: acceptable  Respiratory status: acceptable  Hydration status: acceptable  Postoperative Nausea and Vomiting: none  Comments: Epidural catheter removed by nursing. No redness, swelling, or drainage at puncture site.    Complete resolution of numbness. Patient is able to lift legs, bend at the knees, and ambulate.    Patient denies problems with urination.    Patient denies nausea, headache or severe back pain.         No notable events documented.    "

## 2025-06-11 NOTE — ANESTHESIA PREPROCEDURE EVALUATION
Patient: Miriam Landau    Evaluation Method: In-person visit    Procedure Information    Date: 06/10/25  Procedure: Labor Analgesia         Relevant Problems   Anesthesia   (+) Other complications of spinal and epidural anesthesia during labor and delivery ([facility])      GYN   (+) 39 weeks gestation of pregnancy ([facility])       Clinical information reviewed:   Tobacco  Allergies  Meds   Med Hx  Surg Hx   Fam Hx          NPO Detail:  NPO/Void Status  Date of Last Liquid: 06/10/25  Time of Last Liquid: 0900  Date of Last Solid: 06/10/25  Time of Last Solid: 0900         OB/Gyn Evaluation    Present Pregnancy    Patient is pregnant now.   Obstetric History                Physical Exam    Airway  Mallampati: I  TM distance: >3 FB  Mouth opening: 3 or more finger widths     Cardiovascular - normal exam  Rhythm: regular     Dental - normal exam     Pulmonary Breath sounds clear to auscultation     Abdominal Abdomen: soft             Anesthesia Plan    History of general anesthesia?: no  History of complications of general anesthesia?: no    ASA 2     epidural     The patient is not a current smoker.  Patient was not previously instructed to abstain from smoking on day of procedure.  Patient did not smoke on day of procedure.    Anesthetic plan and risks discussed with patient.

## 2025-06-11 NOTE — ANESTHESIA PROCEDURE NOTES
Epidural Block    Start time: 6/11/2025 11:50 PM  End time: 6/11/2025 12:05 AM  Reason for block: labor analgesia  Staffing  Performed: CRNA   Authorized by: NICKY Gardner    Performed by: NICKY Gardner    Preanesthetic Checklist  Completed: patient identified, IV checked, risks and benefits discussed, surgical consent, pre-op evaluation, timeout performed and sterile techniques followed  Block Timeout  RN/Licensed healthcare professional reads aloud to the Anesthesia provider and entire team: Patient identity, procedure with side and site, patient position, and as applicable the availability of implants/special equipment/special requirements.  Patient on coagulant treatment: no  Timeout performed at: 6/11/2025 11:46 PM  Block Placement  Patient position: sitting  Prep: ChloraPrep  Sterility prep: cap, drape, gloves and mask  Sedation level: no sedation  Patient monitoring: blood pressure, continuous pulse oximetry, EKG and heart rate  Approach: midline  Local numbing: lidocaine 1% to skin and subcutaneous tissues  Vertebral space: lumbar  Lumbar location: L4-L5  Epidural  Loss of resistance technique: saline  Guidance: landmark technique        Needle  Needle type: Tuohy   Needle gauge: 17  Needle length: 9 cm  Needle insertion depth: 6 cm  Catheter type: multi-orifice  Catheter size: 19 G  Catheter at skin depth: 11 cm  Catheter securement method: clear occlusive dressing, liquid medical adhesive, stabilization device and surgical tape    Test dose: lidocaine 1.5% with epinephrine 1-to-200,000  Test dose: lidocaine 1.5% with epinephrine 1-to-200,000  Test dose result: no positive test dose    PCEA  Medication concentration used: 0.2% Ropivacaine with 2 mcg/mL Fentanyl  Dose (mL): 5  Lockout (minutes): 30  1-Hour Limit (boluses/hr): 3  Basal Rate: 10        Assessment  Sensory level: T10  Block outcome: pain improved  Number of attempts: 2  Events: no positive test dose  Procedure  assessment: patient tolerated procedure well with no immediate complications

## 2025-06-11 NOTE — ANESTHESIA PROCEDURE NOTES
Epidural Block    Start time: 6/10/2025 11:50 PM  End time: 6/11/2025 12:05 AM  Reason for block: labor analgesia  Staffing  Performed: CRNA   Authorized by: NICKY Gardner    Performed by: NICKY Gardner    Preanesthetic Checklist  Completed: patient identified, IV checked, risks and benefits discussed, surgical consent, pre-op evaluation, timeout performed and sterile techniques followed  Block Timeout  RN/Licensed healthcare professional reads aloud to the Anesthesia provider and entire team: Patient identity, procedure with side and site, patient position, and as applicable the availability of implants/special equipment/special requirements.    Timeout performed at:   Block Placement  Patient position: sitting  Prep: ChloraPrep  Sterility prep: cap, drape, gloves and mask  Sedation level: no sedation  Patient monitoring: blood pressure, continuous pulse oximetry and heart rate  Approach: midline  Local numbing: lidocaine 1% to skin and subcutaneous tissues  Vertebral space: lumbar  Lumbar location: L4-L5  Epidural  Loss of resistance technique: saline  Guidance: landmark technique        Needle  Needle type: Tuohy   Needle gauge: 17  Needle length: 9 cm  Needle insertion depth: 6 cm  Catheter type: multi-orifice  Catheter size: 19 G  Catheter at skin depth: 11 cm  Catheter securement method: clear occlusive dressing, liquid medical adhesive, stabilization device and surgical tape    Test dose: lidocaine 1.5% with epinephrine 1-to-200,000  Test dose: lidocaine 1.5% with epinephrine 1-to-200,000  Test dose result: no positive test dose    PCEA  Medication concentration used: 0.2% Ropivacaine with 2 mcg/mL Fentanyl  Dose (mL): 5  Lockout (minutes): 30  1-Hour Limit (boluses/hr): 3  Basal Rate: 10        Assessment  Sensory level: T10 bilateral  Block outcome: pain improved  Number of attempts: 1  Events: no positive test dose  Procedure assessment: patient tolerated procedure well with  no immediate complications

## 2025-06-11 NOTE — L&D DELIVERY NOTE
Vaginal Delivery Note    Patient Name: Miriam Landau  : 1995  MRN: [id number]  Age: 29 y.o.    /Para:   Gestational Age: 40w3d    Date of Delivery: 2025    Procedure: Normal Spontaneous Vaginal Delivery    Delivery Provider: joseph childs    Resident/Fellow/Other Assistant: n/a    Description of Procedure:  Delivery of viable infant under epidural anesthesia. Delayed clamping was performed. The infant was placed skin to skin. Cord gases were not sent.  Cord blood was collected. Placenta delivered intact and fundus was firm    1st deg Laceration identified and repaired.    Additional Procedures:  None    Findings:   Amniotic fluid Clear, Male infant in Vertex Occiput Anterior presentation, APGARS 8 , 8 .  Birth Weight 4.185 kg.    Complications: None    Quantitative Blood Loss:   Delivery QBL: 100 mL (2025  2:36 AM - 2025  5:41 AM)    Blood products:      Uterotonics/Hemostatic Agent: IV Pitocin 30 units    Specimen:   Placenta  Delivered: 2025  2:41 AM  Appearance: Intact  Removal: Spontaneous    Disposition: discarded    Sponge/Instrument/Needle Counts: The sponge, lap and needle counts were correct.    Patient Disposition: Patient recovering on labor and delivery in stable condition.    Landau, MiriamBoy [[id number]]      Labor Events    Sac identifier: Sac 1  Rupture date/time: 2025  Rupture type: Artificial  Fluid color: Clear  Fluid odor: None  Labor type: Induced Onset of Labor  Labor allowed to proceed with plans for an attempted vaginal birth?: Yes  Induction: Oxytocin, AROM  Induction date/time: 6/10/2025 1638  Induction indications: Post-term Gestation  Complications: None       Labor Event Times    Labor onset date/time: 6/10/2025 1638  Dilation complete date/time: 2025 0225  Start pushing date/time: 2025 02:33       Labor Length    1st stage: 9h 47m  2nd stage: 0h 11m  3rd stage: 0h 05m       Placenta    Placenta delivery date/time: 2025  02:41  Placenta removal: Spontaneous  Placenta appearance: Intact  Placenta disposition: discarded       Cord    Vessels: 3 vessels  Complications: Nuchal  Nuchal intervention: reduced  Nuchal cord description: loose nuchal cord  Number of loops: 1  Delayed cord clamping?: Yes  Cord clamped date/time: 2025 02:39:00  Cord blood disposition: Lab  Gases sent?: No  Stem cell collection (by provider): No       Lacerations    Perineal laceration: 1st  Perineal laceration repaired?: Yes       Anesthesia    Method: Epidural       Operative Delivery    Forceps attempted?: No  Vacuum extractor attempted?: No       Shoulder Dystocia    Shoulder dystocia present?: No        Delivery    Time head delivered: 2025 02:36:00  Birth date/time: 2025 02:36:00  Delivery type: Vaginal, Spontaneous  Complications: None       Resuscitation    Method: Suctioning, Tactile stimulation, Supplemental oxygen       Apgars    Living status: Living  Apgar Component Scores:  1 min.:  5 min.:  10 min.:  15 min.:  20 min.:    Skin color:  0  0       Heart rate:  2  2       Reflex irritability:  2  2       Muscle tone:  2  2       Respiratory effort:  2  2       Total:  8  8       Apgars assigned by: KATYA SERRANO RN       Delivery Providers    Delivering clinician: Maia Perez APRN-CNM   Provider Role    Sherry Sanchez, RN Delivery Nurse    Elo Serrano, RN Nursery Nurse    Ana Stone, RRT Respiratory Therapist    Waldemar Silverman, APRN-CNP Neonatologist

## 2025-06-11 NOTE — ANESTHESIA PROCEDURE NOTES
Epidural Block    Start time: 6/11/2025 11:50 PM  End time: 6/11/2025 12:05 AM  Reason for block: labor analgesia  Staffing  Performed: CRNA   Authorized by: NICKY Gardner    Performed by: NICKY Gardner    Preanesthetic Checklist  Completed: patient identified, IV checked, risks and benefits discussed, surgical consent, pre-op evaluation, timeout performed and sterile techniques followed  Block Timeout  RN/Licensed healthcare professional reads aloud to the Anesthesia provider and entire team: Patient identity, procedure with side and site, patient position, and as applicable the availability of implants/special equipment/special requirements.  Patient on coagulant treatment: no  Timeout performed at: 6/11/2025 11:46 PM  Block Placement  Patient position: sitting  Prep: ChloraPrep  Sterility prep: cap, drape, gloves and mask  Sedation level: no sedation  Patient monitoring: blood pressure, continuous pulse oximetry, EKG and heart rate  Approach: midline  Local numbing: lidocaine 1% to skin and subcutaneous tissues  Vertebral space: lumbar  Lumbar location: L4-L5  Epidural  Loss of resistance technique: saline  Guidance: landmark technique        Needle  Needle type: Tuohy   Needle gauge: 17  Needle length: 9 cm  Needle insertion depth: 6 cm  Catheter type: multi-orifice  Catheter size: 19 G  Catheter at skin depth: 11 cm  Catheter securement method: clear occlusive dressing, liquid medical adhesive, stabilization device and surgical tape    Test dose: lidocaine 1.5% with epinephrine 1-to-200,000  Test dose: lidocaine 1.5% with epinephrine 1-to-200,000  Test dose result: no positive test dose    PCEA  Medication concentration used: 0.2% Ropivacaine with 2 mcg/mL Fentanyl  Dose (mL): 5  Lockout (minutes): 30  1-Hour Limit (boluses/hr): 3  Basal Rate: 10        Assessment  Sensory level: T10 bilateral  Block outcome: pain improved  Number of attempts: 1  Events: no positive test  dose  Procedure assessment: patient tolerated procedure well with no immediate complications

## 2025-06-12 ENCOUNTER — APPOINTMENT (OUTPATIENT)
Dept: OBSTETRICS AND GYNECOLOGY | Facility: CLINIC | Age: 30
End: 2025-06-12
Payer: COMMERCIAL

## 2025-06-12 VITALS
SYSTOLIC BLOOD PRESSURE: 125 MMHG | BODY MASS INDEX: 38.13 KG/M2 | OXYGEN SATURATION: 98 % | HEART RATE: 61 BPM | TEMPERATURE: 97 F | RESPIRATION RATE: 18 BRPM | HEIGHT: 65 IN | DIASTOLIC BLOOD PRESSURE: 79 MMHG | WEIGHT: 228.84 LBS

## 2025-06-12 PROCEDURE — 2500000001 HC RX 250 WO HCPCS SELF ADMINISTERED DRUGS (ALT 637 FOR MEDICARE OP): Performed by: ADVANCED PRACTICE MIDWIFE

## 2025-06-12 PROCEDURE — 99238 HOSP IP/OBS DSCHRG MGMT 30/<: CPT | Performed by: ADVANCED PRACTICE MIDWIFE

## 2025-06-12 RX ADMIN — IBUPROFEN 600 MG: 600 TABLET ORAL at 05:18

## 2025-06-12 RX ADMIN — IBUPROFEN 600 MG: 600 TABLET ORAL at 11:59

## 2025-06-12 RX ADMIN — BENZOCAINE AND LEVOMENTHOL 1 APPLICATION: 200; 5 SPRAY TOPICAL at 11:59

## 2025-06-12 RX ADMIN — ACETAMINOPHEN 975 MG: 325 TABLET ORAL at 11:59

## 2025-06-12 RX ADMIN — Medication 1 EACH: at 11:59

## 2025-06-12 RX ADMIN — ACETAMINOPHEN 975 MG: 325 TABLET ORAL at 05:18

## 2025-06-12 ASSESSMENT — PAIN SCALES - GENERAL: PAINLEVEL_OUTOF10: 0 - NO PAIN

## 2025-06-12 NOTE — DISCHARGE SUMMARY
Discharge Summary    Admission Date: 6/10/2025  Discharge Date: 25    Discharge Diagnosis  S/p     Hospital Course  Delivery Date: 2025 2:36 AM  Delivery type: Vaginal, Spontaneous   GA at delivery: 40w3d  Outcome: Living  Anesthesia during delivery: Epidural  Intrapartum complications: None  Feeding method: Breastfeeding Status: Yes     Procedures: none  Contraception at discharge: none    Patient doing well. Would like to go home today on PPD #1. Bleeding appropriate. Pain well controlled overall. Patient denies any problems urinating or passing gas. She has been up walking around without issue. Breastfeeding going well. Patient sleeping ok. Moodwise doing ok.     Pertinent Physical Exam At Time of Discharge  General: Examination reveals a well developed, well nourished, female, in no acute distress. She is alert and cooperative  HEENT: External ears normal. Nose normal, no erythema or discharge  Lungs: breathing even and unlabored   Cardiac: warm and well perfused   Breasts: nipples intact, large nipples  Fundus: firm, non-tender, and below umbilicus, lochia light  Extremities: no redness or tenderness in the calves or thighs, no edema  Neurological: alert, oriented, normal speech, no focal findings or movement disorder noted  Psychological: awake and alert; oriented to person, place, and time      Last Vitals:  Temp Pulse Resp BP MAP Pulse Ox   36 °C (96.8 °F) 67 17 136/84 93 97 %     Discharge Meds     Your medication list        START taking these medications        Instructions Last Dose Given Next Dose Due   acetaminophen 500 mg tablet  Commonly known as: Tylenol      Take 2 tablets (1,000 mg) by mouth every 6 hours if needed for mild pain (1 - 3).       ibuprofen 600 mg tablet      Take 1 tablet (600 mg) by mouth every 6 hours if needed for mild pain (1 - 3).              CONTINUE taking these medications        Instructions Last Dose Given Next Dose Due   -iron fum-folic acid 29 mg  iron- 1 mg tablet                  STOP taking these medications      fluticasone 50 mcg/actuation nasal spray  Commonly known as: Flonase        ZyrTEC 10 mg capsule  Generic drug: cetirizine                  Where to Get Your Medications        These medications were sent to [facility]  [address], [place] [facility] [address]      Hours: 8 AM to 6 PM Mon-Fri, 9 AM to 1 PM Saturday Phone: [phone]   acetaminophen 500 mg tablet  ibuprofen 600 mg tablet          Complications Requiring Follow-Up  none    Test Results Pending At Discharge  Pending Labs       No current pending labs.            Outpatient Follow-Up  4-6 week PP visit    I spent 20 minutes in the professional and overall care of this patient.      YANICK Tanner

## 2025-07-24 ENCOUNTER — APPOINTMENT (OUTPATIENT)
Dept: OBSTETRICS AND GYNECOLOGY | Facility: CLINIC | Age: 30
End: 2025-07-24
Payer: COMMERCIAL

## 2025-07-24 ENCOUNTER — TELEPHONE (OUTPATIENT)
Dept: OBSTETRICS AND GYNECOLOGY | Facility: CLINIC | Age: 30
End: 2025-07-24

## 2025-07-24 VITALS
SYSTOLIC BLOOD PRESSURE: 120 MMHG | HEIGHT: 65 IN | DIASTOLIC BLOOD PRESSURE: 70 MMHG | BODY MASS INDEX: 34.82 KG/M2 | WEIGHT: 209 LBS

## 2025-07-24 DIAGNOSIS — Z30.09 BIRTH CONTROL COUNSELING: ICD-10-CM

## 2025-07-24 PROBLEM — Z3A.39 39 WEEKS GESTATION OF PREGNANCY (HHS-HCC): Status: RESOLVED | Noted: 2024-12-29 | Resolved: 2025-07-24

## 2025-07-24 PROCEDURE — 3008F BODY MASS INDEX DOCD: CPT | Performed by: OBSTETRICS & GYNECOLOGY

## 2025-07-24 PROCEDURE — 1036F TOBACCO NON-USER: CPT | Performed by: OBSTETRICS & GYNECOLOGY

## 2025-07-24 RX ORDER — NORETHINDRONE 0.35 MG/1
1 TABLET ORAL DAILY
Qty: 91 TABLET | Refills: 3 | Status: SHIPPED | OUTPATIENT
Start: 2025-07-24 | End: 2026-07-24

## 2025-07-24 ASSESSMENT — PATIENT HEALTH QUESTIONNAIRE - PHQ9
SUM OF ALL RESPONSES TO PHQ9 QUESTIONS 1 AND 2: 0
2. FEELING DOWN, DEPRESSED OR HOPELESS: NOT AT ALL
1. LITTLE INTEREST OR PLEASURE IN DOING THINGS: NOT AT ALL

## 2025-07-24 ASSESSMENT — EDINBURGH POSTNATAL DEPRESSION SCALE (EPDS)
I HAVE FELT SAD OR MISERABLE: NO, NOT AT ALL
I HAVE LOOKED FORWARD WITH ENJOYMENT TO THINGS: AS MUCH AS I EVER DID
THINGS HAVE BEEN GETTING ON TOP OF ME: NO, I HAVE BEEN COPING AS WELL AS EVER
I HAVE BEEN ABLE TO LAUGH AND SEE THE FUNNY SIDE OF THINGS: AS MUCH AS I ALWAYS COULD
I HAVE BEEN ANXIOUS OR WORRIED FOR NO GOOD REASON: NO, NOT AT ALL
I HAVE FELT SCARED OR PANICKY FOR NO GOOD REASON: NO, NOT AT ALL
I HAVE BLAMED MYSELF UNNECESSARILY WHEN THINGS WENT WRONG: NO, NEVER
TOTAL SCORE: 0
THE THOUGHT OF HARMING MYSELF HAS OCCURRED TO ME: NEVER
I HAVE BEEN SO UNHAPPY THAT I HAVE BEEN CRYING: NO, NEVER
I HAVE BEEN SO UNHAPPY THAT I HAVE HAD DIFFICULTY SLEEPING: NOT AT ALL

## 2025-07-24 NOTE — PROGRESS NOTES
"Postpartum Progress Note      Miriam Landau is a 29 y.o., year for her 6 weeks postpartum visit      Subjective   She is passing flatus  She is ambulating well  She is tolerating a No diet orders on file  She reports no breast or nursing problems  She denies emotional concerns today   Her plan for contraception is oral contraceptives     Will start on the minipill as she is breast-feeding.  Has been on it before.  Will start today.    Objective   Allergies:   Patient has no known allergies.         /70   Ht 1.651 m (5' 5\")   Wt 94.8 kg (209 lb)   LMP 09/01/2024   Breastfeeding Yes   BMI 34.78 kg/m²      Physical Exam:  General: No distress.  Alert and oriented  Neck: Thyroid midline no masses no lymphadenopathy  Breast: No masses no erythema no skin changes no lymphadenopathy normal exam bilaterally  Abdomen: Soft, nontender, nondistended  External Genitalia:  no masses.  no erythema.  no discharge noted.  Vagina:  no masses.    Cervix: no masses.  Cervix closed  Uterus:  normal size and contour.  no masses. palpated  Adnexa: R: no masses, non tender.    Adnexa: L: no masses.  non tender  Extremities: No swelling  Psych: No sadness.      Assessment and plan:    1) Postpartum exam  Resume all activity  Birth control options reviewed with patient.   Will start on the minipill.  Side effects and risks reviewed  Will follow-up with her annual before the end of the year  Please follow-up as needed or with your annual exam  "

## 2025-07-24 NOTE — TELEPHONE ENCOUNTER
Miriam Landau came back into the office and stated she was suppose to have a Rx sent to the pharmacy for birth control.      Last office visit: 07/24/2025  Next office visit: 12/1/2025  Med:minipill   Pharmacy: [address] [address]    Ynes Stone MA

## 2025-08-08 ENCOUNTER — TELEPHONE (OUTPATIENT)
Dept: CARDIOLOGY | Facility: CLINIC | Age: 30
End: 2025-08-08
Payer: COMMERCIAL

## 2025-08-08 DIAGNOSIS — Z82.49 FAMILY HISTORY OF HYPERTROPHIC CARDIOMYOPATHY: Primary | ICD-10-CM

## 2025-12-01 ENCOUNTER — APPOINTMENT (OUTPATIENT)
Dept: OBSTETRICS AND GYNECOLOGY | Facility: CLINIC | Age: 30
End: 2025-12-01
Payer: COMMERCIAL